# Patient Record
Sex: FEMALE | Race: WHITE | Employment: UNEMPLOYED | URBAN - NONMETROPOLITAN AREA
[De-identification: names, ages, dates, MRNs, and addresses within clinical notes are randomized per-mention and may not be internally consistent; named-entity substitution may affect disease eponyms.]

---

## 2021-09-03 ENCOUNTER — TELEPHONE (OUTPATIENT)
Dept: PRIMARY CARE CLINIC | Age: 36
End: 2021-09-03

## 2021-09-03 NOTE — TELEPHONE ENCOUNTER
pt been weaning off of Cymbalta. April 7th is when they started this. She is now down to 30 mg and wanted to know if she should refill that prescription or is it ok to now go completely off it. She stated they took her down by 30 mg each time. She has been on the 30 mg for  A month now.     Please advise

## 2021-09-15 DIAGNOSIS — G62.9 NEUROPATHY: Primary | ICD-10-CM

## 2021-09-15 RX ORDER — PREGABALIN 150 MG/1
CAPSULE ORAL
COMMUNITY
Start: 2021-08-17 | End: 2021-09-15 | Stop reason: SDUPTHER

## 2021-09-15 RX ORDER — PREGABALIN 150 MG/1
CAPSULE ORAL
Qty: 60 CAPSULE | Refills: 0 | Status: SHIPPED
Start: 2021-09-15 | End: 2021-10-18 | Stop reason: SDUPTHER

## 2021-09-15 NOTE — TELEPHONE ENCOUNTER
Pt has an appointment to schedule with you. Her first appointment was cancelled she only has enough lyrica for today and tomorrow and needs a refill until she sees you.

## 2021-09-20 ENCOUNTER — VIRTUAL VISIT (OUTPATIENT)
Dept: PRIMARY CARE CLINIC | Age: 36
End: 2021-09-20
Payer: OTHER GOVERNMENT

## 2021-09-20 DIAGNOSIS — Z30.431 IUD CHECK UP: ICD-10-CM

## 2021-09-20 DIAGNOSIS — Z11.59 NEED FOR HEPATITIS C SCREENING TEST: ICD-10-CM

## 2021-09-20 DIAGNOSIS — R56.9 SEIZURES (HCC): ICD-10-CM

## 2021-09-20 DIAGNOSIS — J01.40 ACUTE NON-RECURRENT PANSINUSITIS: ICD-10-CM

## 2021-09-20 DIAGNOSIS — Z11.4 SCREENING FOR HIV (HUMAN IMMUNODEFICIENCY VIRUS): ICD-10-CM

## 2021-09-20 DIAGNOSIS — F41.9 ANXIETY: Primary | ICD-10-CM

## 2021-09-20 DIAGNOSIS — M79.7 FIBROMYALGIA: ICD-10-CM

## 2021-09-20 PROCEDURE — 99443 PR PHYS/QHP TELEPHONE EVALUATION 21-30 MIN: CPT | Performed by: NURSE PRACTITIONER

## 2021-09-20 RX ORDER — DULOXETIN HYDROCHLORIDE 60 MG/1
CAPSULE, DELAYED RELEASE ORAL
COMMUNITY
Start: 2021-06-15 | End: 2021-09-20

## 2021-09-20 RX ORDER — METHYLPREDNISOLONE 4 MG/1
TABLET ORAL
Qty: 1 KIT | Refills: 0 | Status: SHIPPED | OUTPATIENT
Start: 2021-09-20 | End: 2021-09-26

## 2021-09-20 RX ORDER — LEVOTHYROXINE SODIUM 0.03 MG/1
TABLET ORAL
COMMUNITY
Start: 2021-02-03 | End: 2022-04-01 | Stop reason: SDUPTHER

## 2021-09-20 RX ORDER — LEVETIRACETAM 500 MG/1
TABLET ORAL
COMMUNITY
Start: 2021-09-13

## 2021-09-20 RX ORDER — DULOXETIN HYDROCHLORIDE 30 MG/1
CAPSULE, DELAYED RELEASE ORAL
COMMUNITY
Start: 2021-06-18 | End: 2021-09-20

## 2021-09-20 RX ORDER — FLUOXETINE HYDROCHLORIDE 20 MG/1
20 CAPSULE ORAL DAILY
Qty: 30 CAPSULE | Refills: 3 | Status: SHIPPED
Start: 2021-09-20 | End: 2021-11-09

## 2021-09-20 RX ORDER — AZITHROMYCIN 250 MG/1
250 TABLET, FILM COATED ORAL SEE ADMIN INSTRUCTIONS
Qty: 6 TABLET | Refills: 0 | Status: SHIPPED | OUTPATIENT
Start: 2021-09-20 | End: 2021-09-25

## 2021-09-20 SDOH — ECONOMIC STABILITY: FOOD INSECURITY: WITHIN THE PAST 12 MONTHS, THE FOOD YOU BOUGHT JUST DIDN'T LAST AND YOU DIDN'T HAVE MONEY TO GET MORE.: NEVER TRUE

## 2021-09-20 SDOH — ECONOMIC STABILITY: FOOD INSECURITY: WITHIN THE PAST 12 MONTHS, YOU WORRIED THAT YOUR FOOD WOULD RUN OUT BEFORE YOU GOT MONEY TO BUY MORE.: NEVER TRUE

## 2021-09-20 ASSESSMENT — PATIENT HEALTH QUESTIONNAIRE - PHQ9
SUM OF ALL RESPONSES TO PHQ9 QUESTIONS 1 & 2: 0
SUM OF ALL RESPONSES TO PHQ QUESTIONS 1-9: 0
SUM OF ALL RESPONSES TO PHQ QUESTIONS 1-9: 0
1. LITTLE INTEREST OR PLEASURE IN DOING THINGS: 0
SUM OF ALL RESPONSES TO PHQ QUESTIONS 1-9: 0
2. FEELING DOWN, DEPRESSED OR HOPELESS: 0

## 2021-09-20 ASSESSMENT — SOCIAL DETERMINANTS OF HEALTH (SDOH): HOW HARD IS IT FOR YOU TO PAY FOR THE VERY BASICS LIKE FOOD, HOUSING, MEDICAL CARE, AND HEATING?: NOT HARD AT ALL

## 2021-09-20 NOTE — PROGRESS NOTES
Ally Booth is a 39 y.o. female evaluated via telephone on 9/20/2021. Consent:  She and/or health care decision maker is aware that that she may receive a bill for this telephone service, depending on her insurance coverage, and has provided verbal consent to proceed: Yes      Documentation:  I communicated with the patient and/or health care decision maker about to establish care and manage her chronic problems. She has a pertinent past medical history of anxiety, seizures and fibromyalgia. .   Details of this discussion including any medical advice provided: Patient recently moved here from [place] and is establishing care. Patient has establish care with neurology for her seizures with [facility]. She reports no recent seizures. Anxiety is poorly controlled on current regimen. Patient states that she has been taking Cymbalta 30 mg every other day Valium 5 mg in the morning and Soma nightly. She states since the increased dose of Keppra her anxiety has worsened. Previously she follows with psychiatry but has not established with psychiatry since moving. She denies any suicidal or homicidal ideation. Patient's biggest complaint is her fibromyalgia at this time. She has tried numerous medications in the past including TCAs, Cymbalta, gabapentin and currently on Lyrica. She states that her pain is unbearable on a regular basis and causing anxiety. She is going to follow-up with rheumatology at [facility]. I did advise her that given her clinical picture that it would be best if she establishes with psychiatry. I will continue to fill her prescriptions until she can get in with them. We will start the patient on Prozac 20 mg daily and continue all other medications. Administration and side effects were discussed. Red flag symptoms were discussed. She is to follow-up in office in 6 weeks. Patient also has an additional complaint of nasal congestion and nonproductive cough x5 days.   Denies any other associated symptoms including fever, chills, headache, ear pain, sore throat, postnasal drip, loss of taste or smell, chest congestion or shortness of breath. She declines COVID-19 testing at this time. She has tried over-the-counter antihistamines with minimal improvement of symptoms. Mildred Hearn was seen today for establish care, seizures, anxiety, sinus problem and cough. Diagnoses and all orders for this visit:    Anxiety  -     FLUoxetine (PROZAC) 20 MG capsule; Take 1 capsule by mouth daily  -     External Referral To Psychiatry  -     CBC Auto Differential; Future  -     Comprehensive Metabolic Panel; Future  -     TSH without Reflex; Future  -     Lipid Panel; Future  -     HIV Screen; Future  -     Hemoglobin A1C; Future  -     HEPATITIS C ANTIBODY; Future    Fibromyalgia  -     FLUoxetine (PROZAC) 20 MG capsule; Take 1 capsule by mouth daily  -     CBC Auto Differential; Future  -     Comprehensive Metabolic Panel; Future  -     TSH without Reflex; Future  -     Lipid Panel; Future  -     HIV Screen; Future  -     Hemoglobin A1C; Future  -     HEPATITIS C ANTIBODY; Future    Seizures (HCC)  -     TSH without Reflex; Future  -     Lipid Panel; Future  -     HIV Screen; Future  -     Hemoglobin A1C; Future  -     HEPATITIS C ANTIBODY; Future    Acute non-recurrent pansinusitis  -     methylPREDNISolone (MEDROL DOSEPACK) 4 MG tablet; Take by mouth.  -     azithromycin (ZITHROMAX) 250 MG tablet; Take 1 tablet by mouth See Admin Instructions for 5 days 500mg on day 1 followed by 250mg on days 2 - 5    IUD check up  -     ([facility]) - Ervin Carter MD, OB/GYN, [facility] ([facility])    Screening for HIV (human immunodeficiency virus)  -     HIV Screen; Future    Need for hepatitis C screening test  -     HEPATITIS C ANTIBODY; Future      Continue all medications as prescribed, side effects discussed. We will start the patient on Prozac for anxiety and fibromyalgia control. Will refer to psychiatry.   Follow-up in office in 6 weeks. Prescription was written for Medrol Dosepak and azithromycin, side effects and administration instructions discussed. Increase fluids, sleep with the head of the bed elevated. Coolmist humidifier. Tylenol as needed. Patient states that she had an IUD placed in 2018 and has been having irregular bleeding. Will refer to OB/GYN. Will obtain fasting lab work, will call with results. I affirm this is a Patient Initiated Episode with a Patient who has not had a related appointment within my department in the past 7 days or scheduled within the next 24 hours. Patient identification was verified at the start of the visit: Yes    Total Time: minutes: 21-30 minutes    The visit was conducted pursuant to the emergency declaration under the [address] [place] [address], [address] [place] [address] and the [employer] General Act. Patient identification was verified, and a caregiver was present when appropriate. The patient was located in a state where the provider was credentialed to provide care. Note: not billable if this call serves to triage the patient into an appointment for the relevant concern      TORI Jose NP     9/15 nasal congestion, cough. otc dayquil/nyquil/ cool mist humidifer. Seeing psychiatry. .. valium 5mg qd. Neurologist [facility]. Ppd. Increased 30 every other day. 4/7 seizures. Rheumatology [facility]. FIBROMYALGIA.

## 2021-10-14 ENCOUNTER — NURSE ONLY (OUTPATIENT)
Dept: PRIMARY CARE CLINIC | Age: 36
End: 2021-10-14
Payer: OTHER GOVERNMENT

## 2021-10-14 DIAGNOSIS — Z23 NEEDS FLU SHOT: Primary | ICD-10-CM

## 2021-10-14 PROCEDURE — 90674 CCIIV4 VAC NO PRSV 0.5 ML IM: CPT | Performed by: NURSE PRACTITIONER

## 2021-10-14 PROCEDURE — 99999 PR OFFICE/OUTPT VISIT,PROCEDURE ONLY: CPT | Performed by: NURSE PRACTITIONER

## 2021-10-14 PROCEDURE — 90471 IMMUNIZATION ADMIN: CPT | Performed by: NURSE PRACTITIONER

## 2021-10-18 DIAGNOSIS — G62.9 NEUROPATHY: ICD-10-CM

## 2021-10-18 RX ORDER — PREGABALIN 150 MG/1
CAPSULE ORAL
Qty: 60 CAPSULE | Refills: 2 | Status: SHIPPED
Start: 2021-10-18 | End: 2022-01-12

## 2021-10-18 NOTE — TELEPHONE ENCOUNTER
----- Message from Sentrial Must sent at 10/18/2021  9:17 AM EDT -----  Subject: Refill Request    QUESTIONS  Name of Medication? pregabalin (LYRICA) 150 MG capsule  Patient-reported dosage and instructions? 150 MG capsule  How many days do you have left? 0  Preferred Pharmacy? [id number] #[id number]  Pharmacy phone number (if available)? [phone]  ---------------------------------------------------------------------------  --------------,  Name of Medication? levothyroxine (SYNTHROID) 25 MCG tablet  Patient-reported dosage and instructions? 25 mcg tablet  How many days do you have left? 4  Preferred Pharmacy? [id number] #[id number]  Pharmacy phone number (if available)? [phone]  Additional Information for Provider? Patient states she is also on   diazapam as well and it needs refilled. Please advise.  ---------------------------------------------------------------------------  --------------  CALL BACK INFO  What is the best way for the office to contact you? OK to leave message on   voicemail  Preferred Call Back Phone Number?  [phone]

## 2021-10-25 DIAGNOSIS — F41.9 ANXIETY: Primary | ICD-10-CM

## 2021-10-25 RX ORDER — DIAZEPAM 5 MG/1
5 TABLET ORAL DAILY PRN
Qty: 30 TABLET | Refills: 2 | Status: SHIPPED | OUTPATIENT
Start: 2021-10-25 | End: 2021-11-24

## 2021-10-25 NOTE — TELEPHONE ENCOUNTER
Last Appointment:  9/20/2021  Future Appointments   Date Time Provider Ziyad Watsonisti   11/1/2021 10:00 AM TORI Crabtree NP [facility]   11/9/2021  8:30 AM TORI Crabtree NP [facility]   11/9/2021 11:30 AM Tomy Nieto MD [facility]

## 2021-11-09 ENCOUNTER — OFFICE VISIT (OUTPATIENT)
Dept: PRIMARY CARE CLINIC | Age: 36
End: 2021-11-09
Payer: OTHER GOVERNMENT

## 2021-11-09 VITALS
TEMPERATURE: 97.3 F | DIASTOLIC BLOOD PRESSURE: 70 MMHG | OXYGEN SATURATION: 97 % | BODY MASS INDEX: 30.22 KG/M2 | SYSTOLIC BLOOD PRESSURE: 108 MMHG | WEIGHT: 177 LBS | HEIGHT: 64 IN | HEART RATE: 91 BPM

## 2021-11-09 DIAGNOSIS — G40.409 OTHER GENERALIZED EPILEPSY, NOT INTRACTABLE, WITHOUT STATUS EPILEPTICUS (HCC): ICD-10-CM

## 2021-11-09 DIAGNOSIS — M54.42 CHRONIC BILATERAL LOW BACK PAIN WITH BILATERAL SCIATICA: Primary | ICD-10-CM

## 2021-11-09 DIAGNOSIS — F41.9 ANXIETY: ICD-10-CM

## 2021-11-09 DIAGNOSIS — R56.9 SEIZURES (HCC): ICD-10-CM

## 2021-11-09 DIAGNOSIS — M79.7 FIBROMYALGIA: ICD-10-CM

## 2021-11-09 DIAGNOSIS — Z11.4 SCREENING FOR HIV (HUMAN IMMUNODEFICIENCY VIRUS): ICD-10-CM

## 2021-11-09 DIAGNOSIS — Z11.59 NEED FOR HEPATITIS C SCREENING TEST: ICD-10-CM

## 2021-11-09 DIAGNOSIS — M54.41 CHRONIC BILATERAL LOW BACK PAIN WITH BILATERAL SCIATICA: Primary | ICD-10-CM

## 2021-11-09 DIAGNOSIS — G47.00 INSOMNIA, UNSPECIFIED TYPE: ICD-10-CM

## 2021-11-09 DIAGNOSIS — G89.29 CHRONIC BILATERAL LOW BACK PAIN WITH BILATERAL SCIATICA: Primary | ICD-10-CM

## 2021-11-09 LAB
ALBUMIN SERPL-MCNC: 4 G/DL (ref 3.5–5.2)
ALP BLD-CCNC: 101 U/L (ref 35–104)
ALT SERPL-CCNC: 18 U/L (ref 0–32)
ANION GAP SERPL CALCULATED.3IONS-SCNC: 12 MMOL/L (ref 7–16)
AST SERPL-CCNC: 16 U/L (ref 0–31)
BASOPHILS ABSOLUTE: 0.07 E9/L (ref 0–0.2)
BASOPHILS RELATIVE PERCENT: 1.1 % (ref 0–2)
BILIRUB SERPL-MCNC: 0.2 MG/DL (ref 0–1.2)
BUN BLDV-MCNC: 14 MG/DL (ref 6–20)
CALCIUM SERPL-MCNC: 9.2 MG/DL (ref 8.6–10.2)
CHLORIDE BLD-SCNC: 103 MMOL/L (ref 98–107)
CHOLESTEROL, TOTAL: 231 MG/DL (ref 0–199)
CO2: 22 MMOL/L (ref 22–29)
CREAT SERPL-MCNC: 0.9 MG/DL (ref 0.5–1)
EOSINOPHILS ABSOLUTE: 0.19 E9/L (ref 0.05–0.5)
EOSINOPHILS RELATIVE PERCENT: 3 % (ref 0–6)
GFR AFRICAN AMERICAN: >60
GFR NON-AFRICAN AMERICAN: >60 ML/MIN/1.73
GLUCOSE BLD-MCNC: 97 MG/DL (ref 74–99)
HBA1C MFR BLD: 5.1 % (ref 4–5.6)
HCT VFR BLD CALC: 44.7 % (ref 34–48)
HDLC SERPL-MCNC: 35 MG/DL
HEMOGLOBIN: 14.2 G/DL (ref 11.5–15.5)
IMMATURE GRANULOCYTES #: 0.01 E9/L
IMMATURE GRANULOCYTES %: 0.2 % (ref 0–5)
LDL CHOLESTEROL CALCULATED: 175 MG/DL (ref 0–99)
LYMPHOCYTES ABSOLUTE: 2.22 E9/L (ref 1.5–4)
LYMPHOCYTES RELATIVE PERCENT: 34.8 % (ref 20–42)
MCH RBC QN AUTO: 29.8 PG (ref 26–35)
MCHC RBC AUTO-ENTMCNC: 31.8 % (ref 32–34.5)
MCV RBC AUTO: 93.7 FL (ref 80–99.9)
MONOCYTES ABSOLUTE: 0.51 E9/L (ref 0.1–0.95)
MONOCYTES RELATIVE PERCENT: 8 % (ref 2–12)
NEUTROPHILS ABSOLUTE: 3.38 E9/L (ref 1.8–7.3)
NEUTROPHILS RELATIVE PERCENT: 52.9 % (ref 43–80)
PDW BLD-RTO: 13.8 FL (ref 11.5–15)
PLATELET # BLD: 404 E9/L (ref 130–450)
PMV BLD AUTO: 9.9 FL (ref 7–12)
POTASSIUM SERPL-SCNC: 4.7 MMOL/L (ref 3.5–5)
RBC # BLD: 4.77 E12/L (ref 3.5–5.5)
SODIUM BLD-SCNC: 137 MMOL/L (ref 132–146)
TOTAL PROTEIN: 7 G/DL (ref 6.4–8.3)
TRIGL SERPL-MCNC: 107 MG/DL (ref 0–149)
TSH SERPL DL<=0.05 MIU/L-ACNC: 3.44 UIU/ML (ref 0.27–4.2)
VLDLC SERPL CALC-MCNC: 21 MG/DL
WBC # BLD: 6.4 E9/L (ref 4.5–11.5)

## 2021-11-09 PROCEDURE — 96372 THER/PROPH/DIAG INJ SC/IM: CPT | Performed by: NURSE PRACTITIONER

## 2021-11-09 PROCEDURE — 99214 OFFICE O/P EST MOD 30 MIN: CPT | Performed by: NURSE PRACTITIONER

## 2021-11-09 RX ORDER — FLUOXETINE HYDROCHLORIDE 40 MG/1
60 CAPSULE ORAL
COMMUNITY
Start: 2021-11-01

## 2021-11-09 RX ORDER — TRIAMCINOLONE ACETONIDE 40 MG/ML
40 INJECTION, SUSPENSION INTRA-ARTICULAR; INTRAMUSCULAR ONCE
Status: COMPLETED | OUTPATIENT
Start: 2021-11-09 | End: 2021-11-09

## 2021-11-09 RX ORDER — KETOROLAC TROMETHAMINE 30 MG/ML
30 INJECTION, SOLUTION INTRAMUSCULAR; INTRAVENOUS ONCE
Status: COMPLETED | OUTPATIENT
Start: 2021-11-09 | End: 2021-11-09

## 2021-11-09 RX ORDER — CARISOPRODOL 350 MG/1
TABLET ORAL
COMMUNITY
Start: 2021-10-02 | End: 2021-11-09 | Stop reason: SDUPTHER

## 2021-11-09 RX ORDER — CARISOPRODOL 350 MG/1
TABLET ORAL
Qty: 30 TABLET | Refills: 2 | Status: SHIPPED | OUTPATIENT
Start: 2021-11-09 | End: 2022-02-09

## 2021-11-09 RX ADMIN — KETOROLAC TROMETHAMINE 30 MG: 30 INJECTION, SOLUTION INTRAMUSCULAR; INTRAVENOUS at 10:05

## 2021-11-09 RX ADMIN — TRIAMCINOLONE ACETONIDE 40 MG: 40 INJECTION, SUSPENSION INTRA-ARTICULAR; INTRAMUSCULAR at 10:06

## 2021-11-09 ASSESSMENT — ENCOUNTER SYMPTOMS
RHINORRHEA: 0
CONSTIPATION: 0
ABDOMINAL DISTENTION: 0
WHEEZING: 0
SHORTNESS OF BREATH: 0
NAUSEA: 0
BACK PAIN: 1
ABDOMINAL PAIN: 0
COUGH: 0
COLOR CHANGE: 0
VOMITING: 0
EYES NEGATIVE: 1
APNEA: 0
DIARRHEA: 0
VOICE CHANGE: 0
CHEST TIGHTNESS: 0
FACIAL SWELLING: 0

## 2021-11-09 NOTE — PROGRESS NOTES
2021     Ramior Rehman 39 y.o. female    : 1985    Chief Complaint:   Back Pain (back issues, wants sleep study, talk about EEG)       History of Present Illness:   Katie Ugalde is a 39 y.o. female who presents to the office for follow up on her chronic problems. She has a pertinent past medical history for epilepsy, anxiety, chronic low back pain, seizures and fibromyalgia. Patient has moved here from [place] and is currently establishing care with all specialist.  She does follow with neurology at [facility], Dr. Moriah Ibrahim. MRI of the brain was unremarkable. SUZAN positive. EEG revealed frequent spikes. She is currently taking Keppra without any seizures. She has established with psychiatry who increased her Prozac dose and feels that symptoms are controlled. Insomnia is controlled with Soma. Fibromyalgia was diagnosed in . She is currently taking Lyrica with minimal relief of symptoms. She is always in pain. She has been trying to get in with rheumatology, she is requesting a referral to pain managementfor fibromyalgia and chronic low back pain. Back pain has significantly worsened. She does report bilateral radiculopathy symptoms. Denies any bowel or urinary incontinence, weakness or paresthesias. She did have a lumbar spine fusion in 2013. The patient is not up-to-date with health maintenance screenings. Previous lab work was reviewed. Past Medical History:     Past Medical History:   Diagnosis Date    Thyroid disease        Past Surgical History:   Procedure Laterality Date    BACK SURGERY      FOOT SURGERY Left 2014    i and d with biopsy and removal of foreign body       History reviewed. No pertinent family history.     Social History     Tobacco Use    Smoking status: Current Every Day Smoker     Packs/day: 0.50     Types: Cigarettes    Smokeless tobacco: Never Used   Vaping Use    Vaping Use: Every day    Substances: Nicotine    Devices: Pre-filled or refillable cartridge, Refillable tank   Substance Use Topics    Alcohol use: Yes     Comment: occasionally    Drug use: Not on file       Medications:     Current Outpatient Medications:     FLUoxetine (PROZAC) 40 MG capsule, , Disp: , Rfl:     carisoprodol (SOMA) 350 MG tablet, TAKE 1 TABLET BY MOUTH EVERY DAY AT BEDTIME, Disp: 30 tablet, Rfl: 2    diazePAM (VALIUM) 5 MG tablet, Take 1 tablet by mouth daily as needed for Anxiety for up to 30 days. , Disp: 30 tablet, Rfl: 2    pregabalin (LYRICA) 150 MG capsule, TAKE 1 CAPSULE BY MOUTH TWICE DAILY, Disp: 60 capsule, Rfl: 2    levETIRAcetam (KEPPRA) 500 MG tablet, TAKE 1 TABLET BY MOUTH EVERY MORNING AND 2 TABLETS EVERY EVENING, Disp: , Rfl:     levothyroxine (SYNTHROID) 25 MCG tablet, , Disp: , Rfl:     Allergies   Allergen Reactions    Amoxicillin-Pot Clavulanate Hives    Hydrocodone-Acetaminophen      Other reaction(s): Unknown    Vicodin [Hydrocodone-Acetaminophen]        Review of Systems:   Review of Systems   Constitutional: Negative for activity change, appetite change, fatigue, fever and unexpected weight change. HENT: Negative for congestion, facial swelling, mouth sores, postnasal drip, rhinorrhea, sneezing, tinnitus and voice change. Eyes: Negative. Respiratory: Negative for apnea, cough, chest tightness, shortness of breath and wheezing. Cardiovascular: Negative for chest pain, palpitations and leg swelling. Gastrointestinal: Negative for abdominal distention, abdominal pain, constipation, diarrhea, nausea and vomiting. Endocrine: Negative for cold intolerance and heat intolerance. Genitourinary: Negative for difficulty urinating, dysuria, flank pain, frequency, pelvic pain and urgency. Musculoskeletal: Positive for back pain and myalgias. Negative for gait problem, joint swelling and neck pain. Skin: Negative for color change, pallor, rash and wound.    Allergic/Immunologic: Negative for environmental allergies and food allergies. Neurological: Negative for dizziness, tremors, seizures, syncope, facial asymmetry, speech difficulty, weakness, light-headedness, numbness and headaches. Psychiatric/Behavioral: Negative for agitation, behavioral problems, confusion, decreased concentration, dysphoric mood, sleep disturbance and suicidal ideas. The patient is not hyperactive. Physical Exam:   Vital Signs:  /70   Pulse 91   Temp 97.3 °F (36.3 °C)   Ht 5' 4\" (1.626 m)   Wt 177 lb (80.3 kg)   SpO2 97%   BMI 30.38 kg/m²    Oxygen Saturation Interpretation: Normal.  Physical Exam  Vitals and nursing note reviewed. Constitutional:       Appearance: Normal appearance. She is normal weight. HENT:      Head: Normocephalic and atraumatic. Right Ear: Tympanic membrane, ear canal and external ear normal.      Left Ear: Tympanic membrane, ear canal and external ear normal.      Nose: Nose normal.      Mouth/Throat:      Mouth: Mucous membranes are moist.      Pharynx: Oropharynx is clear. Eyes:      Extraocular Movements: Extraocular movements intact. Conjunctiva/sclera: Conjunctivae normal.      Pupils: Pupils are equal, round, and reactive to light. Cardiovascular:      Rate and Rhythm: Normal rate and regular rhythm. Pulses: Normal pulses. Heart sounds: Normal heart sounds. Pulmonary:      Effort: Pulmonary effort is normal.      Breath sounds: Normal breath sounds. No wheezing, rhonchi or rales. Abdominal:      General: Bowel sounds are normal. There is no distension. Palpations: Abdomen is soft. Tenderness: There is no abdominal tenderness. There is no rebound. Musculoskeletal:         General: Normal range of motion. Cervical back: Normal range of motion and neck supple. Lumbar back: No bony tenderness. Normal range of motion. Negative right straight leg raise test and negative left straight leg raise test.      Comments: No midline tenderness. There is tenderness with palpation to the paraspinal region of the lumbar spine. Skin:     General: Skin is warm and dry. Capillary Refill: Capillary refill takes less than 2 seconds. Neurological:      General: No focal deficit present. Mental Status: She is alert and oriented to person, place, and time. Psychiatric:         Mood and Affect: Mood normal.         Behavior: Behavior normal.         Thought Content: Thought content normal.         Judgment: Judgment normal.         Health Maintenance:     Health Maintenance   Topic Date Due    Hepatitis C screen  Never done    Varicella vaccine (1 of 2 - 2-dose childhood series) Never done    Pneumococcal 0-64 years Vaccine (1 of 2 - PPSV23) Never done    COVID-19 Vaccine (1) Never done    HIV screen  Never done    Cervical cancer screen  Never done    DTaP/Tdap/Td vaccine (2 - Td or Tdap) 07/02/2024    Flu vaccine  Completed    Hepatitis A vaccine  Aged Out    Hepatitis B vaccine  Aged Out    Hib vaccine  Aged Out    Meningococcal (ACWY) vaccine  Aged [employer] History   Administered Date(s) Administered    Influenza, MDCK Quadv, IM, PF (Flucelvax 2 yrs and older) 10/14/2021    Tdap (Boostrix, Adacel) 07/02/2014        Testing: All laboratory and radiology results have been personally reviewed by myself. Labs:  No results found for this visit on 11/09/21. Imaging: All Radiology results interpreted by Radiologist unless otherwise noted. No results found. Assessment/Plan:   I personally reviewed the patient's allergies, past medical history, medications, and vitals sign. Oz Gómez was seen today for back pain.     Diagnoses and all orders for this visit:    Chronic bilateral low back pain with bilateral sciatica  -     External Referral To Pain Clinic  -     triamcinolone acetonide (KENALOG-40) injection 40 mg  -     ketorolac (TORADOL) injection 30 mg    Fibromyalgia  -     External Referral To Pain Clinic    Insomnia, unspecified type  -     carisoprodol (SOMA) 350 MG tablet; TAKE 1 TABLET BY MOUTH EVERY DAY AT BEDTIME    Other generalized epilepsy, not intractable, without status epilepticus ([address]    Anxiety    Seizures ([address]        Continue all medications as prescribed, side effects discussed. Keep follow-up appointments with specialist.  I did have an extensive conversation with the patient regarding the prescription of Valium. She should request psychiatry to take over this medication. Kenalog and Toradol injection given today in office. Referral placed to pain management. Will obtain lab work at next visit. Call or go to ED immediately if symptoms worsen or persist.  Return in about 3 months (around 2/9/2022) for 3 mth f/u vv. Sooner if necessary. Counseled regarding above diagnosis, including possible risks and complications,especially if left uncontrolled. Counseled regarding the possible side effects, risks, benefits and alternatives to treatment; patient and/or guardian verbalizes understanding. Advised patient to call with any new medication issues. All questions answered. Reviewed age and gender appropriate health screening exams and vaccinations. Advised patient regarding importance of keeping up with recommended health maintenance and to schedule as soon as possible if overdue, as this is important in assessing for undiagnosed pathology, especially cancer. Patient verbalizes understanding and agrees. David Lewis, TORI - NP     **This report was transcribed using voice recognition software. Every effort was made to ensure accuracy; however, inadvertent computerized transcription errors may be present.

## 2021-11-10 LAB
HEPATITIS C ANTIBODY INTERPRETATION: NORMAL
HIV-1 AND HIV-2 ANTIBODIES: NORMAL

## 2022-02-18 ENCOUNTER — OFFICE VISIT (OUTPATIENT)
Dept: FAMILY MEDICINE CLINIC | Age: 37
End: 2022-02-18
Payer: OTHER GOVERNMENT

## 2022-02-18 VITALS
HEART RATE: 94 BPM | DIASTOLIC BLOOD PRESSURE: 66 MMHG | OXYGEN SATURATION: 96 % | SYSTOLIC BLOOD PRESSURE: 110 MMHG | WEIGHT: 172 LBS | BODY MASS INDEX: 29.37 KG/M2 | RESPIRATION RATE: 18 BRPM | HEIGHT: 64 IN | TEMPERATURE: 97.3 F

## 2022-02-18 DIAGNOSIS — N30.01 ACUTE CYSTITIS WITH HEMATURIA: Primary | ICD-10-CM

## 2022-02-18 DIAGNOSIS — G62.9 NEUROPATHY: ICD-10-CM

## 2022-02-18 DIAGNOSIS — R30.0 DYSURIA: ICD-10-CM

## 2022-02-18 LAB
BILIRUBIN, POC: NORMAL
BLOOD URINE, POC: NORMAL
CLARITY, POC: CLEAR
COLOR, POC: YELLOW
GLUCOSE URINE, POC: NORMAL
KETONES, POC: NORMAL
LEUKOCYTE EST, POC: NORMAL
NITRITE, POC: NORMAL
PH, POC: 7
PROTEIN, POC: NORMAL
SPECIFIC GRAVITY, POC: 1.02
UROBILINOGEN, POC: NORMAL

## 2022-02-18 PROCEDURE — 81002 URINALYSIS NONAUTO W/O SCOPE: CPT | Performed by: NURSE PRACTITIONER

## 2022-02-18 PROCEDURE — 99214 OFFICE O/P EST MOD 30 MIN: CPT | Performed by: NURSE PRACTITIONER

## 2022-02-18 RX ORDER — NITROFURANTOIN 25; 75 MG/1; MG/1
100 CAPSULE ORAL 2 TIMES DAILY
Qty: 10 CAPSULE | Refills: 0 | Status: SHIPPED | OUTPATIENT
Start: 2022-02-18 | End: 2022-02-23

## 2022-02-18 RX ORDER — PREGABALIN 150 MG/1
150 CAPSULE ORAL 2 TIMES DAILY
Qty: 60 CAPSULE | Refills: 0 | Status: SHIPPED
Start: 2022-02-18 | End: 2022-03-28 | Stop reason: SDUPTHER

## 2022-02-18 RX ORDER — PHENAZOPYRIDINE HYDROCHLORIDE 100 MG/1
100 TABLET, FILM COATED ORAL 3 TIMES DAILY PRN
Qty: 6 TABLET | Refills: 0 | Status: SHIPPED | OUTPATIENT
Start: 2022-02-18 | End: 2022-02-20

## 2022-02-18 NOTE — PROGRESS NOTES
2022     Mavis Rehman 44 y.o. female    : 1985  Chief Complaint:   Urinary Tract Infection      History of Present Illness   Source of history provided by:  patientJosey Alexander is a 44 y.o. old female who presents to the [facility] care with complaints of dysuria x 2 days. Reports associated frequency, urgency, and suprapubic pressure. Denies gross hematuria. Denies associated flank pain. Denies any fever, chills, vaginal discharge, vaginal bleeding, possibility of pregnancy, vomiting, diarrhea, or lethargy. No LMP recorded. She also has an additional complaint of neuropathy and requesting refill on Lyrica. Symptoms are controlled with medication. Denies any side effects. ROS   Past Medical History:   Past Medical History:   Diagnosis Date    [facility]     Thyroid disease      Past Surgical History:   Past Surgical History:   Procedure Laterality Date    BACK SURGERY      FOOT SURGERY Left 2014    i and d with biopsy and removal of foreign body     Social History:  reports that she has been smoking cigarettes. She has been smoking about 0.50 packs per day. She has never used smokeless tobacco. She reports current alcohol use. Family History: family history is not on file. Allergies: Amoxicillin-pot clavulanate, Hydrocodone-acetaminophen, and Vicodin [hydrocodone-acetaminophen]    Unless otherwise stated in this report the patient's positive and negative responses for review of systems for constitutional, eyes, ENT, cardiovascular, respiratory, gastrointestinal, neurological, , musculoskeletal, and integument systems and related systems to the presenting problem are either stated in the history of present illness or were not pertinent or were negative for the symptoms and/or complaints related to the presenting medical problem. Positives and pertinent negatives as per HPI. All others reviewed and are negative.     Physical Exam   VS:   Vitals:    22 1151 BP: 110/66   Pulse: 94   Resp: 18   Temp: 97.3 °F (36.3 °C)   TempSrc: Temporal   SpO2: 96%   Weight: 172 lb (78 kg)   Height: 5' 4\" (1.626 m)     Oxygen Saturation Interpretation: Normal.    Constitutional:  A&Ox3, development consistent with age, NAD. Lungs:  CTAB without wheezing, rales, or rhonchi. Heart:  RRR without pathologic murmurs, rubs, or gallops. Abdomen: Soft, nondistended, with mild suprapubic tenderness. No rebound, rigidity, or guarding. BS+ X4. No organomegaly. Back: No CVA tenderness. Skin:  Normal turgor. Warm, dry, without visible rash, unless noted elsewhere. Neurological:  Alert and oriented. Motor functions intact. Responds to verbal commands. Lab / Imaging Results   All laboratory and radiology results have been personally reviewed by myself. Labs:  Results for orders placed or performed in visit on 02/18/22   POCT Urinalysis no Micro   Result Value Ref Range    Color, UA yellow     Clarity, UA clear     Glucose, UA POC neg     Bilirubin, UA neg     Ketones, UA trace     Spec Grav, UA 1.025     Blood, UA POC trace     pH, UA 7.0     Protein, UA POC 30 mg/dl     Urobilinogen, UA 0.2 eu/dl     Leukocytes, UA mod     Nitrite, UA pos        Imaging: All Radiology results interpreted by Radiologist unless otherwise noted. No orders to display       Medical Decision Making:    Patient is well appearing, non toxic and appropriate for outpatient management. Plan is for symptom management and PCP follow up. Assessment / David Ramos was seen today for urinary tract infection. Diagnoses and all orders for this visit:    Acute cystitis with hematuria  -     nitrofurantoin, macrocrystal-monohydrate, (MACROBID) 100 MG capsule; Take 1 capsule by mouth 2 times daily for 5 days  -     phenazopyridine (PYRIDIUM) 100 MG tablet; Take 1 tablet by mouth 3 times daily as needed for Pain    Dysuria  -     POCT Urinalysis no Micro  -     Culture, Urine;  Future    Neuropathy  - pregabalin (LYRICA) 150 MG capsule; Take 1 capsule by mouth 2 times daily for 30 days. UA appears positive for a UTI. Urine C&S pending, will call with results once available. Script written for Macrobid and Pyridium, side effects discussed. Increase fluids and rest. F/u PCP in 3-5 days if symptoms persist. ED sooner if symptoms worsen or change. ED immediately with the development of fever, shaking chills, body aches, flank pain, vomiting, CP, or SOB. Pt is in agreement with this care plan. All questions answered.      Lurdes Lin, APRN - NP

## 2022-02-20 LAB
ORGANISM: ABNORMAL
URINE CULTURE, ROUTINE: ABNORMAL

## 2022-02-28 DIAGNOSIS — G47.00 INSOMNIA, UNSPECIFIED TYPE: Primary | ICD-10-CM

## 2022-02-28 RX ORDER — CARISOPRODOL 350 MG/1
350 TABLET ORAL DAILY
Qty: 30 TABLET | Refills: 0 | Status: SHIPPED | OUTPATIENT
Start: 2022-02-28 | End: 2022-03-30

## 2022-02-28 NOTE — TELEPHONE ENCOUNTER
Last Appointment:  2/18/2022  Future Appointments   Date Time Provider Ziyad Trcaey   3/8/2022  9:40 AM Jamie Aldrich MD [facility]   4/1/2022  9:00 AM Craig Jones

## 2022-03-25 DIAGNOSIS — G62.9 NEUROPATHY: ICD-10-CM

## 2022-03-25 DIAGNOSIS — G47.00 INSOMNIA, UNSPECIFIED TYPE: ICD-10-CM

## 2022-03-25 RX ORDER — DIAZEPAM 5 MG/1
5 TABLET ORAL EVERY 6 HOURS PRN
Qty: 120 TABLET | Refills: 0 | Status: CANCELLED | OUTPATIENT
Start: 2022-03-25 | End: 2022-04-24

## 2022-03-25 RX ORDER — DIAZEPAM 5 MG/1
5 TABLET ORAL EVERY 6 HOURS PRN
Qty: 120 TABLET | Refills: 1 | Status: CANCELLED | OUTPATIENT
Start: 2022-03-25 | End: 2022-04-24

## 2022-03-25 RX ORDER — CARISOPRODOL 350 MG/1
350 TABLET ORAL DAILY
Qty: 30 TABLET | Refills: 0 | Status: CANCELLED | OUTPATIENT
Start: 2022-03-25 | End: 2022-04-24

## 2022-03-25 RX ORDER — PREGABALIN 150 MG/1
150 CAPSULE ORAL 2 TIMES DAILY
Qty: 60 CAPSULE | Refills: 0 | Status: CANCELLED | OUTPATIENT
Start: 2022-03-25 | End: 2022-04-24

## 2022-03-25 NOTE — TELEPHONE ENCOUNTER
Informed patient that she would need to come in through express care and be evaluated again. We are unable to send in something. Patient stated she would just try OTC medications.

## 2022-03-25 NOTE — TELEPHONE ENCOUNTER
Last Appointment:  2/18/2022  Future Appointments   Date Time Provider Ziyad Webb   4/1/2022  9:00 AM Craig Glez

## 2022-03-25 NOTE — TELEPHONE ENCOUNTER
----- Message from Franc Patel sent at 3/25/2022  8:56 AM EDT -----  Subject: Message to Provider    QUESTIONS  Information for Provider? pt is calling bc her uti came back. it went away   for few days and now back. pt is asking for stronger meds. she has a cold   and does not want to come in the office if possible   ---------------------------------------------------------------------------  --------------  [address]  What is the best way for the office to contact you? OK to leave message on   voicemail  Preferred Call Back Phone Number? [phone]  ---------------------------------------------------------------------------  --------------  SCRIPT ANSWERS  Relationship to Patient?  Self

## 2022-03-28 DIAGNOSIS — G62.9 NEUROPATHY: ICD-10-CM

## 2022-03-28 RX ORDER — PREGABALIN 150 MG/1
150 CAPSULE ORAL 2 TIMES DAILY
Qty: 60 CAPSULE | Refills: 0 | Status: SHIPPED
Start: 2022-03-28 | End: 2022-04-01 | Stop reason: SDUPTHER

## 2022-04-01 ENCOUNTER — OFFICE VISIT (OUTPATIENT)
Dept: PRIMARY CARE CLINIC | Age: 37
End: 2022-04-01
Payer: OTHER GOVERNMENT

## 2022-04-01 ENCOUNTER — TELEPHONE (OUTPATIENT)
Dept: PRIMARY CARE CLINIC | Age: 37
End: 2022-04-01

## 2022-04-01 VITALS
HEART RATE: 86 BPM | OXYGEN SATURATION: 97 % | RESPIRATION RATE: 16 BRPM | SYSTOLIC BLOOD PRESSURE: 116 MMHG | HEIGHT: 64 IN | BODY MASS INDEX: 29.71 KG/M2 | DIASTOLIC BLOOD PRESSURE: 60 MMHG | WEIGHT: 174 LBS

## 2022-04-01 DIAGNOSIS — M79.7 FIBROMYALGIA: ICD-10-CM

## 2022-04-01 DIAGNOSIS — F41.9 ANXIETY: ICD-10-CM

## 2022-04-01 DIAGNOSIS — E03.9 HYPOTHYROIDISM, UNSPECIFIED TYPE: ICD-10-CM

## 2022-04-01 DIAGNOSIS — F31.9 BIPOLAR 1 DISORDER (HCC): ICD-10-CM

## 2022-04-01 DIAGNOSIS — R35.0 FREQUENCY OF URINATION: ICD-10-CM

## 2022-04-01 DIAGNOSIS — R56.9 SEIZURES (HCC): Primary | ICD-10-CM

## 2022-04-01 DIAGNOSIS — N30.01 ACUTE CYSTITIS WITH HEMATURIA: ICD-10-CM

## 2022-04-01 DIAGNOSIS — G47.00 INSOMNIA, UNSPECIFIED TYPE: ICD-10-CM

## 2022-04-01 DIAGNOSIS — G62.9 NEUROPATHY: ICD-10-CM

## 2022-04-01 DIAGNOSIS — H69.83 ETD (EUSTACHIAN TUBE DYSFUNCTION), BILATERAL: ICD-10-CM

## 2022-04-01 LAB
AMPHETAMINE SCREEN, URINE: NOT DETECTED
BARBITURATE SCREEN URINE: NOT DETECTED
BENZODIAZEPINE SCREEN, URINE: POSITIVE
BILIRUBIN, POC: NEGATIVE
BLOOD URINE, POC: NORMAL
CANNABINOID SCREEN URINE: NOT DETECTED
CLARITY, POC: NORMAL
COCAINE METABOLITE SCREEN URINE: NOT DETECTED
COLOR, POC: NORMAL
FENTANYL SCREEN, URINE: NOT DETECTED
GLUCOSE URINE, POC: NEGATIVE
KETONES, POC: NEGATIVE
LEUKOCYTE EST, POC: NORMAL
Lab: ABNORMAL
METHADONE SCREEN, URINE: NOT DETECTED
NITRITE, POC: NEGATIVE
OPIATE SCREEN URINE: NOT DETECTED
OXYCODONE URINE: NOT DETECTED
PH, POC: 6.5
PHENCYCLIDINE SCREEN URINE: NOT DETECTED
PROTEIN, POC: NEGATIVE
SPECIFIC GRAVITY, POC: 1.02
UROBILINOGEN, POC: 0.2

## 2022-04-01 PROCEDURE — 99214 OFFICE O/P EST MOD 30 MIN: CPT | Performed by: NURSE PRACTITIONER

## 2022-04-01 PROCEDURE — 81002 URINALYSIS NONAUTO W/O SCOPE: CPT | Performed by: NURSE PRACTITIONER

## 2022-04-01 RX ORDER — LAMOTRIGINE 100 MG/1
TABLET ORAL
COMMUNITY
Start: 2022-03-07 | End: 2022-09-27

## 2022-04-01 RX ORDER — CARISOPRODOL 350 MG/1
350 TABLET ORAL DAILY
COMMUNITY
End: 2022-04-01 | Stop reason: SDUPTHER

## 2022-04-01 RX ORDER — DIAZEPAM 5 MG/1
5 TABLET ORAL EVERY 6 HOURS PRN
Qty: 120 TABLET | Refills: 3 | Status: CANCELLED | OUTPATIENT
Start: 2022-04-01 | End: 2022-05-01

## 2022-04-01 RX ORDER — CEFDINIR 300 MG/1
300 CAPSULE ORAL 2 TIMES DAILY
Qty: 14 CAPSULE | Refills: 0 | Status: SHIPPED | OUTPATIENT
Start: 2022-04-01 | End: 2022-04-08

## 2022-04-01 RX ORDER — CARISOPRODOL 350 MG/1
TABLET ORAL
Qty: 30 TABLET | OUTPATIENT
Start: 2022-04-01

## 2022-04-01 RX ORDER — PREGABALIN 150 MG/1
150 CAPSULE ORAL 2 TIMES DAILY
Qty: 60 CAPSULE | Refills: 0 | Status: CANCELLED | OUTPATIENT
Start: 2022-04-01 | End: 2022-05-01

## 2022-04-01 RX ORDER — LEVOTHYROXINE SODIUM 0.03 MG/1
25 TABLET ORAL DAILY
Qty: 90 TABLET | Refills: 1 | Status: SHIPPED | OUTPATIENT
Start: 2022-04-01 | End: 2022-05-01

## 2022-04-01 RX ORDER — CARISOPRODOL 350 MG/1
350 TABLET ORAL DAILY
Qty: 30 TABLET | Refills: 3 | Status: SHIPPED
Start: 2022-04-01 | End: 2022-08-11

## 2022-04-01 RX ORDER — PHENAZOPYRIDINE HYDROCHLORIDE 100 MG/1
100 TABLET, FILM COATED ORAL 3 TIMES DAILY PRN
Qty: 6 TABLET | Refills: 0 | Status: SHIPPED | OUTPATIENT
Start: 2022-04-01 | End: 2022-04-03

## 2022-04-01 RX ORDER — LEVOTHYROXINE SODIUM 0.03 MG/1
25 TABLET ORAL DAILY
Qty: 30 TABLET | Refills: 3 | Status: SHIPPED
Start: 2022-04-01 | End: 2022-04-01

## 2022-04-01 RX ORDER — PREDNISONE 10 MG/1
TABLET ORAL
Qty: 18 TABLET | Refills: 0 | Status: SHIPPED | OUTPATIENT
Start: 2022-04-01 | End: 2022-04-10

## 2022-04-01 RX ORDER — PREGABALIN 150 MG/1
150 CAPSULE ORAL 2 TIMES DAILY
Qty: 60 CAPSULE | Refills: 2 | Status: SHIPPED | OUTPATIENT
Start: 2022-04-01 | End: 2022-05-01

## 2022-04-01 ASSESSMENT — ENCOUNTER SYMPTOMS
ABDOMINAL DISTENTION: 0
DIARRHEA: 0
SHORTNESS OF BREATH: 0
APNEA: 0
EYES NEGATIVE: 1
ABDOMINAL PAIN: 0
RHINORRHEA: 0
COLOR CHANGE: 0
BACK PAIN: 1
CONSTIPATION: 0
WHEEZING: 0
COUGH: 0
VOICE CHANGE: 0
FACIAL SWELLING: 0
NAUSEA: 0
VOMITING: 0
CHEST TIGHTNESS: 0

## 2022-04-01 NOTE — PROGRESS NOTES
2022     Tawny Rehman 44 y.o. female    : 1985    Chief Complaint:   Urinary Tract Infection       History of Present Illness:   Leti Guadalupe is a 44 y.o. female who presents to the office for follow up on her chronic problems. She has a pertinent past medical history for epilepsy, anxiety, chronic low back pain, seizures and fibromyalgia. She does follow with neurology at [facility], Dr. Hallie Cruz. MRI of the brain was unremarkable. SUZAN positive. EEG revealed frequent spikes. She is currently taking Keppra without any seizures. She does report new onset vertigo and has a follow up appointment in 1 week. Anxiety/depression- She has established with psychiatry, Agata Bates a few pills her medications. She is taking her medications as prescribed. Insomnia is controlled with Soma. Fibromyalgia was diagnosed in . She is currently taking Lyrica with relief of symptoms. She is always in pain. Back pain has significantly worsened. She does report bilateral radiculopathy symptoms. Denies any bowel or urinary incontinence, weakness or paresthesias. She did have a lumbar spine fusion in 2013. She currently follows with Dr. Darlyn Perales who suggested injections vs. Ablation. The patient is not up-to-date with health maintenance screenings. Previous lab work was reviewed. Past Medical History:     Past Medical History:   Diagnosis Date    Epilepsy ([address]     Thyroid disease        Past Surgical History:   Procedure Laterality Date    BACK SURGERY      FOOT SURGERY Left 2014    i and d with biopsy and removal of foreign body       History reviewed. No pertinent family history.     Social History     Tobacco Use    Smoking status: Current Every Day Smoker     Packs/day: 0.50     Types: Cigarettes    Smokeless tobacco: Never Used   Vaping Use    Vaping Use: Every day    Substances: Nicotine    Devices: Pre-filled or refillable cartridge, Refillable tank   Substance Use Topics    Alcohol use: Yes     Comment: occasionally    Drug use: Not on file       Medications:     Current Outpatient Medications:     pregabalin (LYRICA) 150 MG capsule, Take 1 capsule by mouth 2 times daily for 30 days. , Disp: 60 capsule, Rfl: 2    levothyroxine (SYNTHROID) 25 MCG tablet, Take 1 tablet by mouth Daily, Disp: 30 tablet, Rfl: 3    carisoprodol (SOMA) 350 MG tablet, Take 1 tablet by mouth daily for 30 days. , Disp: 30 tablet, Rfl: 3    phenazopyridine (PYRIDIUM) 100 MG tablet, Take 1 tablet by mouth 3 times daily as needed for Pain, Disp: 6 tablet, Rfl: 0    cefdinir (OMNICEF) 300 MG capsule, Take 1 capsule by mouth 2 times daily for 7 days, Disp: 14 capsule, Rfl: 0    predniSONE (DELTASONE) 10 MG tablet, Take 3 tablets by mouth daily for 3 days, THEN 2 tablets daily for 3 days, THEN 1 tablet daily for 3 days. , Disp: 18 tablet, Rfl: 0    diclofenac (VOLTAREN) 75 MG EC tablet, TAKE 1 TABLET BY MOUTH TWICE DAILY WITH FOOD AS NEEDED FOR PAIN, Disp: , Rfl:     lamoTRIgine (LAMICTAL) 25 MG tablet, , Disp: , Rfl:     ARIPiprazole (ABILIFY) 2 MG tablet, , Disp: , Rfl:     diazePAM (VALIUM) 5 MG tablet, Take 5 mg by mouth every 6 hours as needed for Anxiety. , Disp: , Rfl:     FLUoxetine (PROZAC) 40 MG capsule, 60 mg , Disp: , Rfl:     levETIRAcetam (KEPPRA) 500 MG tablet, TAKE 1 TABLET BY MOUTH EVERY MORNING AND 2 TABLETS EVERY EVENING, Disp: , Rfl:     lamoTRIgine (LAMICTAL) 100 MG tablet, TAKE 1 TABLET BY MOUTH TWICE DAILY, Disp: , Rfl:     Allergies   Allergen Reactions    Amoxicillin-Pot Clavulanate Hives    Hydrocodone-Acetaminophen      Other reaction(s): Unknown    Vicodin [Hydrocodone-Acetaminophen]        Review of Systems:   Review of Systems   Constitutional: Negative for activity change, appetite change, fatigue, fever and unexpected weight change. HENT: Negative for congestion, facial swelling, mouth sores, postnasal drip, rhinorrhea, sneezing, tinnitus and voice change. Eyes: Negative. Respiratory: Negative for apnea, cough, chest tightness, shortness of breath and wheezing. Cardiovascular: Negative for chest pain, palpitations and leg swelling. Gastrointestinal: Negative for abdominal distention, abdominal pain, constipation, diarrhea, nausea and vomiting. Endocrine: Negative for cold intolerance and heat intolerance. Genitourinary: Negative for difficulty urinating, dysuria, flank pain, frequency, pelvic pain and urgency. Musculoskeletal: Positive for back pain. Negative for gait problem, joint swelling, myalgias and neck pain. Skin: Negative for color change, pallor, rash and wound. Allergic/Immunologic: Negative for environmental allergies and food allergies. Neurological: Positive for dizziness. Negative for tremors, seizures, syncope, facial asymmetry, speech difficulty, weakness, light-headedness, numbness and headaches. Psychiatric/Behavioral: Negative for agitation, behavioral problems, confusion, decreased concentration, dysphoric mood, sleep disturbance and suicidal ideas. The patient is not hyperactive. Physical Exam:   Vital Signs:  /60   Pulse 86   Resp 16   Ht 5' 4\" (1.626 m)   Wt 174 lb (78.9 kg)   SpO2 97%   BMI 29.87 kg/m²    Oxygen Saturation Interpretation: Normal.  Physical Exam  Vitals and nursing note reviewed. Constitutional:       Appearance: Normal appearance. She is normal weight. HENT:      Head: Normocephalic and atraumatic. Right Ear: Ear canal and external ear normal. A middle ear effusion is present. Left Ear: Ear canal and external ear normal. A middle ear effusion is present. Nose: Nose normal.      Mouth/Throat:      Mouth: Mucous membranes are moist.      Pharynx: Oropharynx is clear. Eyes:      Extraocular Movements: Extraocular movements intact. Conjunctiva/sclera: Conjunctivae normal.      Pupils: Pupils are equal, round, and reactive to light.    Cardiovascular:      Rate and Rhythm: Normal rate and regular rhythm. Pulses: Normal pulses. Heart sounds: Normal heart sounds. Pulmonary:      Effort: Pulmonary effort is normal.      Breath sounds: Normal breath sounds. No wheezing, rhonchi or rales. Abdominal:      General: Bowel sounds are normal. There is no distension. Palpations: Abdomen is soft. Tenderness: There is no abdominal tenderness. There is no rebound. Musculoskeletal:         General: Normal range of motion. Cervical back: Normal range of motion and neck supple. Lumbar back: No bony tenderness. Normal range of motion. Negative right straight leg raise test and negative left straight leg raise test.      Comments: No midline tenderness. There is tenderness with palpation to the paraspinal region of the lumbar spine. Skin:     General: Skin is warm and dry. Capillary Refill: Capillary refill takes less than 2 seconds. Neurological:      General: No focal deficit present. Mental Status: She is alert and oriented to person, place, and time. Psychiatric:         Mood and Affect: Mood normal. Affect is flat. Behavior: Behavior normal.         Thought Content:  Thought content normal.         Judgment: Judgment normal.         Health Maintenance:     Health Maintenance   Topic Date Due    Varicella vaccine (1 of 2 - 2-dose childhood series) Never done    COVID-19 Vaccine (1) Never done    Pneumococcal 0-64 years Vaccine (1 of 2 - PPSV23) Never done    Depression Monitoring  09/20/2022    DTaP/Tdap/Td vaccine (2 - Td or Tdap) 07/02/2024    Cervical cancer screen  01/20/2025    Flu vaccine  Completed    Hepatitis C screen  Completed    HIV screen  Completed    Hepatitis A vaccine  Aged Out    Hepatitis B vaccine  Aged Out    Hib vaccine  Aged Out    Meningococcal (ACWY) vaccine  Aged [employer] History   Administered Date(s) Administered    Influenza, MDCK Quadv, IM, PF (Flucelvax 2 yrs and older) 10/14/2021    Tdap (Boostrix, Adacel) 07/02/2014        Testing: All laboratory and radiology results have been personally reviewed by myself. Labs:  Results for orders placed or performed in visit on 04/01/22   POCT Urinalysis no Micro   Result Value Ref Range    Color, UA      Clarity, UA      Glucose, UA POC negative     Bilirubin, UA negative     Ketones, UA negative     Spec Grav, UA 1.020     Blood, UA POC moderate     pH, UA 6.5     Protein, UA POC negative     Urobilinogen, UA 0.2     Leukocytes, UA small     Nitrite, UA negative         Imaging: All Radiology results interpreted by Radiologist unless otherwise noted. No results found. Assessment/Plan:   I personally reviewed the patient's allergies, past medical history, medications, and vitals sign. Leyla Sanchez was seen today for urinary tract infection. Diagnoses and all orders for this visit:    Seizures ([address]    Frequency of urination  -     POCT Urinalysis no Micro  -     Culture, Urine; Future    Neuropathy  -     pregabalin (LYRICA) 150 MG capsule; Take 1 capsule by mouth 2 times daily for 30 days. Anxiety  -     URINE DRUG SCREEN; Future    Insomnia, unspecified type  -     carisoprodol (SOMA) 350 MG tablet; Take 1 tablet by mouth daily for 30 days. Fibromyalgia    Acute cystitis with hematuria  -     phenazopyridine (PYRIDIUM) 100 MG tablet; Take 1 tablet by mouth 3 times daily as needed for Pain  -     cefdinir (OMNICEF) 300 MG capsule; Take 1 capsule by mouth 2 times daily for 7 days    Bipolar 1 disorder (HCC)    ETD (Eustachian tube dysfunction), bilateral  -     predniSONE (DELTASONE) 10 MG tablet; Take 3 tablets by mouth daily for 3 days, THEN 2 tablets daily for 3 days, THEN 1 tablet daily for 3 days. Hypothyroidism, unspecified type  -     levothyroxine (SYNTHROID) 25 MCG tablet; Take 1 tablet by mouth Daily        Continue all medications as prescribed, side effects discussed.   Keep follow-up appointments with specialist.  Prescription written for Ioana Praveen report reviewed and appropriate. I did advise the patient that I cannot get her Valium given that this is managed by psychiatry. We will start the patient on cefdinir and Pyridium for UTI, previous urine culture was positive for greater than 100 K E. coli. We will start the patient on prednisone taper for eustachian tube dysfunction. Advised patient to take antihistamine over-the-counter and Sudafed for the next 3 days. Call or go to ED immediately if symptoms worsen or persist.  Return in about 3 months (around 7/1/2022) for 3-month follow-up. Sooner if necessary. Counseled regarding above diagnosis, including possible risks and complications,especially if left uncontrolled. Counseled regarding the possible side effects, risks, benefits and alternatives to treatment; patient and/or guardian verbalizes understanding. Advised patient to call with any new medication issues. All questions answered. Reviewed age and gender appropriate health screening exams and vaccinations. Advised patient regarding importance of keeping up with recommended health maintenance and to schedule as soon as possible if overdue, as this is important in assessing for undiagnosed pathology, especially cancer. Patient verbalizes understanding and agrees. Leanne Her, TORI - NP     **This report was transcribed using voice recognition software. Every effort was made to ensure accuracy; however, inadvertent computerized transcription errors may be present.

## 2022-04-01 NOTE — Clinical Note
Can we please call the patient know that I cannot fill the Valium. This is filled by psychiatry and she needs to call their office for refills.

## 2022-04-04 LAB
ORGANISM: ABNORMAL
URINE CULTURE, ROUTINE: ABNORMAL

## 2022-06-08 ENCOUNTER — OFFICE VISIT (OUTPATIENT)
Dept: FAMILY MEDICINE CLINIC | Age: 37
End: 2022-06-08
Payer: OTHER GOVERNMENT

## 2022-06-08 VITALS
SYSTOLIC BLOOD PRESSURE: 118 MMHG | RESPIRATION RATE: 18 BRPM | WEIGHT: 174 LBS | TEMPERATURE: 97.8 F | BODY MASS INDEX: 29.71 KG/M2 | DIASTOLIC BLOOD PRESSURE: 70 MMHG | HEART RATE: 96 BPM | HEIGHT: 64 IN | OXYGEN SATURATION: 97 %

## 2022-06-08 DIAGNOSIS — S76.919A MUSCLE STRAIN OF THIGH, INITIAL ENCOUNTER: Primary | ICD-10-CM

## 2022-06-08 DIAGNOSIS — R30.0 DYSURIA: ICD-10-CM

## 2022-06-08 DIAGNOSIS — N30.01 ACUTE CYSTITIS WITH HEMATURIA: ICD-10-CM

## 2022-06-08 LAB
BILIRUBIN, POC: ABNORMAL
BLOOD URINE, POC: ABNORMAL
CLARITY, POC: ABNORMAL
COLOR, POC: YELLOW
GLUCOSE URINE, POC: ABNORMAL
KETONES, POC: ABNORMAL
LEUKOCYTE EST, POC: ABNORMAL
NITRITE, POC: POSITIVE
PH, POC: 5
PROTEIN, POC: ABNORMAL
SPECIFIC GRAVITY, POC: >=1.03
UROBILINOGEN, POC: 0.2

## 2022-06-08 PROCEDURE — 81002 URINALYSIS NONAUTO W/O SCOPE: CPT | Performed by: NURSE PRACTITIONER

## 2022-06-08 PROCEDURE — 99214 OFFICE O/P EST MOD 30 MIN: CPT | Performed by: NURSE PRACTITIONER

## 2022-06-08 PROCEDURE — 96372 THER/PROPH/DIAG INJ SC/IM: CPT | Performed by: NURSE PRACTITIONER

## 2022-06-08 RX ORDER — METHYLPREDNISOLONE 4 MG/1
TABLET ORAL
Qty: 1 KIT | Refills: 0 | Status: SHIPPED | OUTPATIENT
Start: 2022-06-09 | End: 2022-06-14

## 2022-06-08 RX ORDER — TRIAMCINOLONE ACETONIDE 40 MG/ML
40 INJECTION, SUSPENSION INTRA-ARTICULAR; INTRAMUSCULAR ONCE
Status: COMPLETED | OUTPATIENT
Start: 2022-06-08 | End: 2022-06-08

## 2022-06-08 RX ORDER — CYCLOBENZAPRINE HCL 5 MG
5 TABLET ORAL 3 TIMES DAILY PRN
Qty: 30 TABLET | Refills: 0 | Status: SHIPPED | OUTPATIENT
Start: 2022-06-08 | End: 2022-06-18

## 2022-06-08 RX ORDER — KETOROLAC TROMETHAMINE 30 MG/ML
30 INJECTION, SOLUTION INTRAMUSCULAR; INTRAVENOUS ONCE
Status: COMPLETED | OUTPATIENT
Start: 2022-06-08 | End: 2022-06-08

## 2022-06-08 RX ORDER — NITROFURANTOIN 25; 75 MG/1; MG/1
100 CAPSULE ORAL 2 TIMES DAILY
Qty: 10 CAPSULE | Refills: 0 | Status: SHIPPED | OUTPATIENT
Start: 2022-06-08 | End: 2022-06-13

## 2022-06-08 RX ORDER — PHENAZOPYRIDINE HYDROCHLORIDE 100 MG/1
100 TABLET, FILM COATED ORAL 3 TIMES DAILY PRN
Qty: 6 TABLET | Refills: 0 | Status: SHIPPED | OUTPATIENT
Start: 2022-06-08 | End: 2022-06-10

## 2022-06-08 RX ADMIN — TRIAMCINOLONE ACETONIDE 40 MG: 40 INJECTION, SUSPENSION INTRA-ARTICULAR; INTRAMUSCULAR at 08:55

## 2022-06-08 RX ADMIN — KETOROLAC TROMETHAMINE 30 MG: 30 INJECTION, SOLUTION INTRAMUSCULAR; INTRAVENOUS at 08:54

## 2022-06-08 ASSESSMENT — PATIENT HEALTH QUESTIONNAIRE - PHQ9: DEPRESSION UNABLE TO ASSESS: PT REFUSES

## 2022-06-08 NOTE — PROGRESS NOTES
Chief Complaint:   Back Pain (pt fell yesterday) and Knee Pain      History of Present Illness   Source of history provided by:  patient. Micheline Bourgeois is a 44 y.o. old female who presents to the walk in clinic for evaluation of low back pain and bilateral posterior thigh pain that occurred 1 day ago. Patient reports that she was standing on a swivel chair hanging pictures and she fell directly on her buttocks. She did not lose consciousness. She did not hit her head. She has tried nothing over-the-counter for symptomatic relief. Pt states the pain is worse with movement and improves with lying flat. There is no radiation of the pain into the buttocks/leg. Denies red flag symptoms including fever, chills, sweats, weight loss, bowel/bladder incontinence, numbness, weakness or saddle anesthesia. Pt denies any abdominal pain, N/V/D, HA, neck pain, recent illness, dysuria, or lethargy. She has a additional complaint of dysuria x1 month. Previously patient was treated with cefdinir for a UTI, urine culture was positive for greater than 100,000 E. coli. She reports she has urinary frequency, urgency and burning with associated malodorous urine. She denies any fever, chills, flank pain, hematuria, chance of pregnancy or abdominal discomfort. Review of Systems   Unless otherwise stated in this report or unable to obtain because of the patient's clinical or mental status as evidenced by the medical record, this patients's positive and negative responses for Review of Systems, constitutional, psych, eyes, ENT, cardiovascular, respiratory, gastrointestinal, neurological, genitourinary, musculoskeletal, integument systems and systems related to the presenting problem are either stated in the preceding or were not pertinent or were negative for the symptoms and/or complaints related to the medical problem. Past Medical History:  has a past medical history of Epilepsy ([facility] [address] and Thyroid disease.    Past Surgical History:  has a past surgical history that includes back surgery and Foot surgery (Left, 7/2/2014). Social History:  reports that she has been smoking cigarettes. She has been smoking about 0.50 packs per day. She has never used smokeless tobacco. She reports current alcohol use. Family History: family history is not on file. Allergies: Amoxicillin-pot clavulanate, Hydrocodone-acetaminophen, and Vicodin [hydrocodone-acetaminophen]    Physical Exam   Vital Signs:  /70   Pulse 96   Temp 97.8 °F (36.6 °C) (Temporal)   Resp 18   Ht 5' 4\" (1.626 m)   Wt 174 lb (78.9 kg)   SpO2 97%   BMI 29.87 kg/m²    Oxygen Saturation Interpretation: Normal.    Physical Exam  Vitals and nursing note reviewed. Constitutional:       Appearance: Normal appearance. She is normal weight. HENT:      Head: Normocephalic and atraumatic. Right Ear: Tympanic membrane, ear canal and external ear normal.      Left Ear: Tympanic membrane, ear canal and external ear normal.      Nose: Nose normal.      Mouth/Throat:      Mouth: Mucous membranes are moist.      Pharynx: Oropharynx is clear. Eyes:      Extraocular Movements: Extraocular movements intact. Conjunctiva/sclera: Conjunctivae normal.      Pupils: Pupils are equal, round, and reactive to light. Cardiovascular:      Rate and Rhythm: Normal rate and regular rhythm. Pulses: Normal pulses. Heart sounds: Normal heart sounds. Pulmonary:      Effort: Pulmonary effort is normal.      Breath sounds: Normal breath sounds. No wheezing, rhonchi or rales. Abdominal:      General: Bowel sounds are normal. There is no distension. Palpations: Abdomen is soft. Tenderness: There is abdominal tenderness in the suprapubic area. There is no rebound. Musculoskeletal:         General: Normal range of motion. Cervical back: Normal range of motion and neck supple. Right upper leg: No tenderness or bony tenderness.       Left upper leg: No tenderness or bony tenderness. Right lower leg: No swelling. No edema. Left lower leg: No swelling. No edema. Comments: Decreased range of motion with hip flexion due to pain. Skin:     General: Skin is warm and dry. Capillary Refill: Capillary refill takes less than 2 seconds. Neurological:      General: No focal deficit present. Mental Status: She is alert and oriented to person, place, and time. Psychiatric:         Mood and Affect: Mood normal.         Behavior: Behavior normal.         Thought Content: Thought content normal.         Judgment: Judgment normal.           Test Results Section   (All laboratory and radiology results have been personally reviewed by myself)  Labs:  Results for orders placed or performed in visit on 06/08/22   POCT Urinalysis no Micro   Result Value Ref Range    Color, UA yellow     Clarity, UA cloudy     Glucose, UA POC neg     Bilirubin, UA neg     Ketones, UA trace     Spec Grav, UA >=1.030     Blood, UA POC trace-lysed     pH, UA 5.0     Protein, UA POC neg     Urobilinogen, UA 0.2     Leukocytes, UA neg     Nitrite, UA positive        Imaging: All Radiology results interpreted by Radiologist unless otherwise noted. Assessment / Plan   Impression(s):  Cruz Krueger was seen today for back pain and knee pain. Diagnoses and all orders for this visit:    Muscle strain of thigh, initial encounter  -     ketorolac (TORADOL) injection 30 mg  -     methylPREDNISolone (MEDROL DOSEPACK) 4 MG tablet; Take by mouth. -     triamcinolone acetonide (KENALOG-40) injection 40 mg  -     cyclobenzaprine (FLEXERIL) 5 MG tablet; Take 1 tablet by mouth 3 times daily as needed for Muscle spasms    Dysuria  -     POCT Urinalysis no Micro  -     Culture, Urine; Future    Acute cystitis with hematuria  -     nitrofurantoin, macrocrystal-monohydrate, (MACROBID) 100 MG capsule;  Take 1 capsule by mouth 2 times daily for 5 days  -     phenazopyridine (PYRIDIUM) 100 MG tablet; Take 1 tablet by mouth 3 times daily as needed for Pain      Kenalog and Toradol injection given today in office. Prescription written for Medrol Dosepak and Flexeril, side effects and administration instructions discussed. Avoid taking medication in conjunction with Soma or other sedatives. RICE therapy. UA in office appears positive, will call in Macrobid and Pyridium, side effects and administration instructions discussed. Increase fluids. Good pericare and hygiene were discussed. PCP in 1-2 weeks if symptoms persist. ED sooner if symptoms worsen or change. ED immediately with any red flag symptoms including fever, severe or worsening back pain, paresthesias, weakness, or GI/ incontinence. Pt states understanding and is in agreement with this care plan. All questions answered. No follow-ups on file.     TORI Simental NP

## 2022-06-10 LAB
ORGANISM: ABNORMAL
URINE CULTURE, ROUTINE: ABNORMAL

## 2022-07-26 ENCOUNTER — OFFICE VISIT (OUTPATIENT)
Dept: FAMILY MEDICINE CLINIC | Age: 37
End: 2022-07-26
Payer: OTHER GOVERNMENT

## 2022-07-26 VITALS
SYSTOLIC BLOOD PRESSURE: 118 MMHG | OXYGEN SATURATION: 97 % | DIASTOLIC BLOOD PRESSURE: 66 MMHG | HEART RATE: 105 BPM | HEIGHT: 64 IN | BODY MASS INDEX: 29.71 KG/M2 | RESPIRATION RATE: 18 BRPM | WEIGHT: 174 LBS | TEMPERATURE: 98.1 F

## 2022-07-26 DIAGNOSIS — Z02.89 ENCOUNTER TO OBTAIN EXCUSE FROM WORK: Primary | ICD-10-CM

## 2022-07-26 PROCEDURE — 99203 OFFICE O/P NEW LOW 30 MIN: CPT | Performed by: PHYSICIAN ASSISTANT

## 2022-07-26 ASSESSMENT — ENCOUNTER SYMPTOMS
BACK PAIN: 0
VOMITING: 0
DIARRHEA: 0
SORE THROAT: 0
NAUSEA: 0
COUGH: 0
ABDOMINAL PAIN: 0
PHOTOPHOBIA: 0
SHORTNESS OF BREATH: 0

## 2022-07-26 NOTE — LETTER
[facility]  [address] [place] [address]  Phone: [phone]  Fax: [phone]    [place], [place]        July 26, 2022     Patient: Nathan Hernandez   YOB: 1985   Date of Visit: 7/26/2022       To Whom it May Concern:    Devona Holter was seen in my clinic on 7/26/2022. She may return to work on 7/27/2022. If you have any questions or concerns, please don't hesitate to call.     Sincerely,         MICHELLE JUÁREZ

## 2022-07-26 NOTE — PROGRESS NOTES
22  Arti Fridge : 1985 Sex: female  Age 44 y.o. Subjective:  Chief Complaint   Patient presents with    Other         27-year-old female with a history of hypothyroidism, bipolar 1 disorder, insomnia, neuropathy, anxiety, fibromyalgia and seizures presents to the walk-in clinic to obtain a work excuse. Patient states about 12 days ago she had a breakthrough seizure for the first time in about 1 year and 3 months. She states it caused her to miss the subsequent Friday and Saturday of work. She states that her work would like an excuse for those days missed. She states that on 7/15 she talk to her neurologist at the [facility]. They increased her Lyrica. Patient states today she was at work and while staring at the computer she felt like she was going to have another seizure. She states she did not feel safe staying at work. She states because she had to leave she again needs a work excuse. Patient does admit to having cold-like symptoms for the last 10 days. She states that her daughter has tested negative for COVID-19. She states that her  had similar symptoms to but he did not test either. Patient denies any chance of pregnancy. She denies any headache. No facial droop or slurring speech. She denies lateralizing weakness. No visual disturbances. Review of Systems   Constitutional:  Negative for chills and fever. HENT:  Negative for congestion, ear pain and sore throat. Eyes:  Negative for photophobia and visual disturbance. Respiratory:  Negative for cough and shortness of breath. Cardiovascular:  Negative for chest pain. Gastrointestinal:  Negative for abdominal pain, diarrhea, nausea and vomiting. Genitourinary:  Negative for difficulty urinating, dysuria, frequency and urgency. Musculoskeletal:  Negative for back pain, neck pain and neck stiffness. Skin:  Negative for rash.    Neurological:  Negative for dizziness, syncope, weakness, light-headedness and headaches. Hematological:  Negative for adenopathy. Does not bruise/bleed easily. Psychiatric/Behavioral:  Negative for agitation and confusion. All other systems reviewed and are negative. PMH:     Past Medical History:   Diagnosis Date    Epilepsy ([address]     Thyroid disease        Past Surgical History:   Procedure Laterality Date    BACK SURGERY      FOOT SURGERY Left 7/2/2014    i and d with biopsy and removal of foreign body       History reviewed. No pertinent family history. Medications:     Current Outpatient Medications:     lamoTRIgine (LAMICTAL) 100 MG tablet, TAKE 1 TABLET BY MOUTH TWICE DAILY, Disp: , Rfl:     diclofenac (VOLTAREN) 75 MG EC tablet, TAKE 1 TABLET BY MOUTH TWICE DAILY WITH FOOD AS NEEDED FOR PAIN, Disp: , Rfl:     lamoTRIgine (LAMICTAL) 25 MG tablet, , Disp: , Rfl:     ARIPiprazole (ABILIFY) 2 MG tablet, , Disp: , Rfl:     FLUoxetine (PROZAC) 40 MG capsule, 60 mg , Disp: , Rfl:     levETIRAcetam (KEPPRA) 500 MG tablet, TAKE 1 TABLET BY MOUTH EVERY MORNING AND 2 TABLETS EVERY EVENING, Disp: , Rfl:     pregabalin (LYRICA) 150 MG capsule, Take 1 capsule by mouth 2 times daily for 30 days. , Disp: 60 capsule, Rfl: 2    levothyroxine (SYNTHROID) 25 MCG tablet, TAKE 1 TABLET BY MOUTH DAILY, Disp: 90 tablet, Rfl: 1    Allergies: Allergies   Allergen Reactions    Amoxicillin-Pot Clavulanate Hives    Hydrocodone-Acetaminophen      Other reaction(s): Unknown    Vicodin [Hydrocodone-Acetaminophen]        Social History:     Social History     Tobacco Use    Smoking status: Every Day     Packs/day: 0.50     Types: Cigarettes    Smokeless tobacco: Never   Vaping Use    Vaping Use: Every day    Substances: Nicotine    Devices: Pre-filled or refillable cartridge, Refillable tank   Substance Use Topics    Alcohol use: Yes     Comment: occasionally       Patient lives at home.     Physical Exam:     Vitals:    07/26/22 1118   BP: 118/66   Pulse: (!) 105   Resp: 18 Temp: 98.1 °F (36.7 °C)   TempSrc: Temporal   SpO2: 97%   Weight: 174 lb (78.9 kg)   Height: 5' 4\" (1.626 m)       Exam:  Physical Exam  Vitals and nursing note reviewed. Constitutional:       General: She is not in acute distress. Appearance: She is well-developed. HENT:      Head: Normocephalic and atraumatic. Right Ear: Tympanic membrane normal.      Left Ear: Tympanic membrane normal.      Nose: Nose normal.      Mouth/Throat:      Mouth: Mucous membranes are moist.      Pharynx: Oropharynx is clear. Eyes:      Conjunctiva/sclera: Conjunctivae normal.      Pupils: Pupils are equal, round, and reactive to light. Cardiovascular:      Rate and Rhythm: Normal rate and regular rhythm. Pulmonary:      Effort: Pulmonary effort is normal. No respiratory distress. Breath sounds: Normal breath sounds. Abdominal:      General: Bowel sounds are normal.      Palpations: Abdomen is soft. Tenderness: There is no abdominal tenderness. Musculoskeletal:         General: Normal range of motion. Cervical back: Normal range of motion. No rigidity. Lymphadenopathy:      Cervical: No cervical adenopathy. Skin:     General: Skin is warm and dry. Neurological:      General: No focal deficit present. Mental Status: She is alert and oriented to person, place, and time. Cranial Nerves: No cranial nerve deficit. Sensory: No sensory deficit. Motor: No weakness. Coordination: Coordination normal.      Gait: Gait normal.      Deep Tendon Reflexes: Reflexes normal.   Psychiatric:         Mood and Affect: Mood normal.         Behavior: Behavior normal.         Thought Content: Thought content normal.         Judgment: Judgment normal.         Testing:           Medical Decision Making:       Patient upon arrival did not appear toxic or lethargic. Vital signs were reviewed. Past medical history reviewed. Allergies reviewed. Medications reviewed.      Patient is presenting for a work excuse. I informed the patient that I would not be writing her a work note to excuse her for Friday and Saturday 2 weeks ago because she was not seen or evaluated in our office at that time. She will be given an excuse for today. I discussed that if she has another breakthrough seizure to please follow-up with her neurologist.  Patient understands a plan is agreeable. Clinical Impression:   Radha Olivera was seen today for other. Diagnoses and all orders for this visit:    Encounter to obtain excuse from work      The patient is to call for any concerns or return if any of the signs or symptoms worsen. The patient is to follow-up with PCP in the next 2-3 days for repeat evaluation repeat assessment or go directly to the emergency department. SIGNATURE: Tg Matute PA-C

## 2022-08-11 DIAGNOSIS — G47.00 INSOMNIA, UNSPECIFIED TYPE: ICD-10-CM

## 2022-08-11 RX ORDER — CARISOPRODOL 350 MG/1
350 TABLET ORAL DAILY
Qty: 30 TABLET | Refills: 0 | Status: SHIPPED
Start: 2022-08-11 | End: 2022-09-27 | Stop reason: SDUPTHER

## 2022-09-11 DIAGNOSIS — G47.00 INSOMNIA, UNSPECIFIED TYPE: ICD-10-CM

## 2022-09-12 RX ORDER — CARISOPRODOL 350 MG/1
TABLET ORAL
Qty: 30 TABLET | OUTPATIENT
Start: 2022-09-12

## 2022-09-12 NOTE — TELEPHONE ENCOUNTER
This is a controlled substance. Patient needs a follow-up appointment prior to refilling this prescription.

## 2022-09-12 NOTE — TELEPHONE ENCOUNTER
Left message for patient letting her know she would need to set up an appointment before medications can be refilled.

## 2022-09-27 ENCOUNTER — OFFICE VISIT (OUTPATIENT)
Dept: PRIMARY CARE CLINIC | Age: 37
End: 2022-09-27
Payer: OTHER GOVERNMENT

## 2022-09-27 VITALS
WEIGHT: 189 LBS | HEIGHT: 64 IN | OXYGEN SATURATION: 98 % | TEMPERATURE: 97.5 F | RESPIRATION RATE: 16 BRPM | DIASTOLIC BLOOD PRESSURE: 70 MMHG | HEART RATE: 99 BPM | BODY MASS INDEX: 32.27 KG/M2 | SYSTOLIC BLOOD PRESSURE: 122 MMHG

## 2022-09-27 DIAGNOSIS — M54.2 CERVICALGIA: ICD-10-CM

## 2022-09-27 DIAGNOSIS — M54.41 CHRONIC BILATERAL LOW BACK PAIN WITH BILATERAL SCIATICA: ICD-10-CM

## 2022-09-27 DIAGNOSIS — M54.42 CHRONIC BILATERAL LOW BACK PAIN WITH BILATERAL SCIATICA: ICD-10-CM

## 2022-09-27 DIAGNOSIS — Z23 NEED FOR VACCINATION: ICD-10-CM

## 2022-09-27 DIAGNOSIS — G47.00 INSOMNIA, UNSPECIFIED TYPE: ICD-10-CM

## 2022-09-27 DIAGNOSIS — G89.29 CHRONIC BILATERAL LOW BACK PAIN WITH BILATERAL SCIATICA: ICD-10-CM

## 2022-09-27 DIAGNOSIS — E03.9 HYPOTHYROIDISM, UNSPECIFIED TYPE: ICD-10-CM

## 2022-09-27 DIAGNOSIS — G62.9 NEUROPATHY: ICD-10-CM

## 2022-09-27 DIAGNOSIS — K21.9 GASTROESOPHAGEAL REFLUX DISEASE WITHOUT ESOPHAGITIS: ICD-10-CM

## 2022-09-27 DIAGNOSIS — F51.01 PRIMARY INSOMNIA: ICD-10-CM

## 2022-09-27 DIAGNOSIS — M79.7 FIBROMYALGIA: Primary | ICD-10-CM

## 2022-09-27 PROCEDURE — 90471 IMMUNIZATION ADMIN: CPT | Performed by: NURSE PRACTITIONER

## 2022-09-27 PROCEDURE — 99214 OFFICE O/P EST MOD 30 MIN: CPT | Performed by: NURSE PRACTITIONER

## 2022-09-27 PROCEDURE — 90674 CCIIV4 VAC NO PRSV 0.5 ML IM: CPT | Performed by: NURSE PRACTITIONER

## 2022-09-27 RX ORDER — OMEPRAZOLE 20 MG/1
20 CAPSULE, DELAYED RELEASE ORAL
Qty: 30 CAPSULE | Refills: 5 | Status: SHIPPED
Start: 2022-09-27 | End: 2022-09-27

## 2022-09-27 RX ORDER — OMEPRAZOLE 20 MG/1
CAPSULE, DELAYED RELEASE ORAL
Qty: 90 CAPSULE | Refills: 1 | Status: SHIPPED | OUTPATIENT
Start: 2022-09-27

## 2022-09-27 RX ORDER — CLONAZEPAM 0.5 MG/1
TABLET ORAL
COMMUNITY
Start: 2022-08-11

## 2022-09-27 RX ORDER — CARISOPRODOL 350 MG/1
350 TABLET ORAL DAILY
Qty: 30 TABLET | Refills: 2 | Status: SHIPPED | OUTPATIENT
Start: 2022-09-27 | End: 2022-10-27

## 2022-09-27 SDOH — ECONOMIC STABILITY: FOOD INSECURITY: WITHIN THE PAST 12 MONTHS, YOU WORRIED THAT YOUR FOOD WOULD RUN OUT BEFORE YOU GOT MONEY TO BUY MORE.: SOMETIMES TRUE

## 2022-09-27 SDOH — ECONOMIC STABILITY: FOOD INSECURITY: WITHIN THE PAST 12 MONTHS, THE FOOD YOU BOUGHT JUST DIDN'T LAST AND YOU DIDN'T HAVE MONEY TO GET MORE.: SOMETIMES TRUE

## 2022-09-27 ASSESSMENT — PATIENT HEALTH QUESTIONNAIRE - PHQ9
3. TROUBLE FALLING OR STAYING ASLEEP: 2
8. MOVING OR SPEAKING SO SLOWLY THAT OTHER PEOPLE COULD HAVE NOTICED. OR THE OPPOSITE, BEING SO FIGETY OR RESTLESS THAT YOU HAVE BEEN MOVING AROUND A LOT MORE THAN USUAL: 0
SUM OF ALL RESPONSES TO PHQ9 QUESTIONS 1 & 2: 0
SUM OF ALL RESPONSES TO PHQ QUESTIONS 1-9: 2
6. FEELING BAD ABOUT YOURSELF - OR THAT YOU ARE A FAILURE OR HAVE LET YOURSELF OR YOUR FAMILY DOWN: 0
5. POOR APPETITE OR OVEREATING: 0
2. FEELING DOWN, DEPRESSED OR HOPELESS: 0
9. THOUGHTS THAT YOU WOULD BE BETTER OFF DEAD, OR OF HURTING YOURSELF: 0
4. FEELING TIRED OR HAVING LITTLE ENERGY: 0
7. TROUBLE CONCENTRATING ON THINGS, SUCH AS READING THE NEWSPAPER OR WATCHING TELEVISION: 0
SUM OF ALL RESPONSES TO PHQ QUESTIONS 1-9: 2
1. LITTLE INTEREST OR PLEASURE IN DOING THINGS: 0

## 2022-09-27 ASSESSMENT — SOCIAL DETERMINANTS OF HEALTH (SDOH): HOW HARD IS IT FOR YOU TO PAY FOR THE VERY BASICS LIKE FOOD, HOUSING, MEDICAL CARE, AND HEATING?: HARD

## 2022-09-27 ASSESSMENT — ENCOUNTER SYMPTOMS
COUGH: 0
ABDOMINAL PAIN: 0
NAUSEA: 1
FACIAL SWELLING: 0
DIARRHEA: 0
BACK PAIN: 1
SHORTNESS OF BREATH: 0
WHEEZING: 0
VOICE CHANGE: 0
CHEST TIGHTNESS: 0
EYES NEGATIVE: 1
ABDOMINAL DISTENTION: 0
RHINORRHEA: 0
VOMITING: 0
COLOR CHANGE: 0
CONSTIPATION: 0
APNEA: 0

## 2022-09-27 NOTE — PROGRESS NOTES
2022     Gita Rehman 44 y.o. female    : 1985    Chief Complaint:   Medication Refill and Hypothyroidism       History of Present Illness:   Dana Goodwin is a 44 y.o. female who presents to the office for follow up on her chronic problems. She has a pertinent past medical history for epilepsy, anxiety, chronic low back pain, seizures and fibromyalgia. She does follow with neurology at [facility], Dr. Calixto Weldon. MRI of the brain was unremarkable. SUZAN positive. EEG revealed frequent spikes. She is currently taking Keppra and per patient she was initiated on another medication that she cannot recall the name. Her last seizure was approximately 2 months ago. Anxiety/depression- She has established with psychiatry, Cynthia Molina a few pills her medications. She is taking her medications as prescribed. Insomnia is controlled with Soma. She reports that she has been out of this for approximately a week and she has been having recurrent symptoms and poor sleep. Fibromyalgia was diagnosed in . She is currently taking Lyrica with relief of symptoms. She is always in pain. Back pain has significantly worsened. She does report bilateral radiculopathy symptoms. Denies any bowel or urinary incontinence, weakness or paresthesias. She did have a lumbar spine fusion in 2013. She established with Dr. Be Funk, unfortunately she had missed numerous appointments and they dismissed her from the practice. She is requesting a referral to a new pain management doctor at this time. She has an additional complaint of poorly controlled acid reflux. She has been taking Tums over-the-counter with minimal relief of symptoms. She reports that symptoms occur on a daily basis. She does attempt to avoid food triggers and denies eating large meals prior to bedtime. She  The patient is not up-to-date with health maintenance screenings. Previous lab work was reviewed.     Past Medical History:     Past Medical History:   Diagnosis Date    Epilepsy ([address]     Thyroid disease        Past Surgical History:   Procedure Laterality Date    BACK SURGERY      FOOT SURGERY Left 7/2/2014    i and d with biopsy and removal of foreign body       History reviewed. No pertinent family history. Social History     Tobacco Use    Smoking status: Every Day     Packs/day: 0.50     Types: Cigarettes    Smokeless tobacco: Never   Vaping Use    Vaping Use: Every day    Substances: Nicotine    Devices: Pre-filled or refillable cartridge, Refillable tank   Substance Use Topics    Alcohol use: Yes     Comment: occasionally       Medications:     Current Outpatient Medications:     carisoprodol (SOMA) 350 MG tablet, Take 1 tablet by mouth daily for 30 days. , Disp: 30 tablet, Rfl: 2    diclofenac (VOLTAREN) 75 MG EC tablet, TAKE 1 TABLET BY MOUTH TWICE DAILY WITH FOOD AS NEEDED FOR PAIN, Disp: , Rfl:     ARIPiprazole (ABILIFY) 2 MG tablet, , Disp: , Rfl:     FLUoxetine (PROZAC) 40 MG capsule, 60 mg , Disp: , Rfl:     levETIRAcetam (KEPPRA) 500 MG tablet, TAKE 1 TABLET BY MOUTH EVERY MORNING AND 2 TABLETS EVERY EVENING, Disp: , Rfl:     clonazePAM (KLONOPIN) 0.5 MG tablet, TAKE 1 TABLET BY MOUTH THREE TIMES DAILY, Disp: , Rfl:     omeprazole (PRILOSEC) 20 MG delayed release capsule, TAKE 1 CAPSULE BY MOUTH EVERY MORNING BEFORE BREAKFAST, Disp: 90 capsule, Rfl: 1    pregabalin (LYRICA) 150 MG capsule, Take 1 capsule by mouth 2 times daily for 30 days. , Disp: 60 capsule, Rfl: 2    levothyroxine (SYNTHROID) 25 MCG tablet, TAKE 1 TABLET BY MOUTH DAILY, Disp: 90 tablet, Rfl: 1    Allergies   Allergen Reactions    Amoxicillin-Pot Clavulanate Hives    Vicodin [Hydrocodone-Acetaminophen]     Hydrocodone-Acetaminophen Nausea And Vomiting     Other reaction(s): Unknown       Review of Systems:   Review of Systems   Constitutional:  Negative for activity change, appetite change, fatigue, fever and unexpected weight change.    HENT: Extraocular Movements: Extraocular movements intact. Conjunctiva/sclera: Conjunctivae normal.      Pupils: Pupils are equal, round, and reactive to light. Cardiovascular:      Rate and Rhythm: Normal rate and regular rhythm. Pulses: Normal pulses. Heart sounds: Normal heart sounds. Pulmonary:      Effort: Pulmonary effort is normal.      Breath sounds: Normal breath sounds. No wheezing, rhonchi or rales. Abdominal:      General: Bowel sounds are normal. There is no distension. Palpations: Abdomen is soft. Tenderness: There is no abdominal tenderness. There is no rebound. Musculoskeletal:         General: Normal range of motion. Cervical back: Normal range of motion and neck supple. Lumbar back: No bony tenderness. Normal range of motion. Negative right straight leg raise test and negative left straight leg raise test.      Comments: No midline tenderness. There is tenderness with palpation to the paraspinal region of the lumbar spine. Skin:     General: Skin is warm and dry. Capillary Refill: Capillary refill takes less than 2 seconds. Neurological:      General: No focal deficit present. Mental Status: She is alert and oriented to person, place, and time. Psychiatric:         Mood and Affect: Mood normal. Affect is flat. Behavior: Behavior normal.         Thought Content:  Thought content normal.         Judgment: Judgment normal.       Health Maintenance:     Health Maintenance   Topic Date Due    COVID-19 Vaccine (1) Never done    Varicella vaccine (1 of 2 - 2-dose childhood series) Never done    Pneumococcal 0-64 years Vaccine (1 - PCV) Never done    Depression Monitoring  09/20/2022    DTaP/Tdap/Td vaccine (2 - Td or Tdap) 07/02/2024    Cervical cancer screen  01/20/2025    Flu vaccine  Completed    Hepatitis C screen  Completed    HIV screen  Completed    Hepatitis A vaccine  Aged Out    Hepatitis B vaccine  Aged Out    Hib vaccine  Aged Out Meningococcal (ACWY) vaccine  Aged Out        Immunization History   Administered Date(s) Administered    Influenza, FLUCELVAX, (age 10 mo+), MDCK, PF, 0.5mL 10/14/2021, 09/27/2022    Tdap (Boostrix, Adacel) 07/02/2014        Testing: All laboratory and radiology results have been personally reviewed by myself. Labs:  No results found for this visit on 09/27/22. Imaging: All Radiology results interpreted by Radiologist unless otherwise noted. No results found. Assessment/Plan:   I personally reviewed the patient's allergies, past medical history, medications, and vitals sign. Cash Pickering was seen today for medication refill and hypothyroidism. Diagnoses and all orders for this visit:    Fibromyalgia  -     External Referral To Pain Clinic  -     CBC; Future  -     Comprehensive Metabolic Panel; Future  -     Lipid Panel; Future  -     TSH; Future    Cervicalgia  -     External Referral To Pain Clinic    Chronic bilateral low back pain with bilateral sciatica  -     External Referral To Pain Clinic    Neuropathy  -     External Referral To Pain Clinic    Insomnia, unspecified type  -     carisoprodol (SOMA) 350 MG tablet; Take 1 tablet by mouth daily for 30 days. Primary insomnia  -     carisoprodol (SOMA) 350 MG tablet; Take 1 tablet by mouth daily for 30 days. Need for vaccination  -     Influenza, FLUCELVAX, (age 10 mo+), IM, Preservative Free, 0.5 mL    BMI 32.0-32.9,adult  -     Vitamin D 25 Hydroxy; Future    Hypothyroidism, unspecified type  -     CBC; Future  -     Comprehensive Metabolic Panel; Future  -     Lipid Panel; Future  -     TSH; Future    Gastroesophageal reflux disease without esophagitis    Other orders  -     Discontinue: omeprazole (PRILOSEC) 20 MG delayed release capsule; Take 1 capsule by mouth every morning (before breakfast)      Continue all medications as prescribed, side effects discussed.   Keep follow-up appointments with specialist.  Prescription written for Cynthia Singer report reviewed and appropriate. Patient is having symptoms of poorly controlled GERD, will initiate omeprazole, side effects and administration instructions discussed. Avoid food triggers and large meals prior to bedtime. Patient is agreeable to influenza vaccine today. Referral placed to pain management. Increase activity as tolerated and follow a healthy diet. Call or go to ED immediately if symptoms worsen or persist.  Return in about 3 months (around 12/27/2022) for 3-month follow-up. Sooner if necessary. Counseled regarding above diagnosis, including possible risks and complications,especially if left uncontrolled. Counseled regarding the possible side effects, risks, benefits and alternatives to treatment; patient and/or guardian verbalizes understanding. Advised patient to call with any new medication issues. All questions answered. Reviewed age and gender appropriate health screening exams and vaccinations. Advised patient regarding importance of keeping up with recommended health maintenance and to schedule as soon as possible if overdue, as this is important in assessing for undiagnosed pathology, especially cancer. Patient verbalizes understanding and agrees. TORI Flores - NP     **This report was transcribed using voice recognition software. Every effort was made to ensure accuracy; however, inadvertent computerized transcription errors may be present.

## 2022-10-24 ENCOUNTER — OFFICE VISIT (OUTPATIENT)
Dept: FAMILY MEDICINE CLINIC | Age: 37
End: 2022-10-24
Payer: OTHER GOVERNMENT

## 2022-10-24 VITALS
TEMPERATURE: 97.5 F | OXYGEN SATURATION: 98 % | SYSTOLIC BLOOD PRESSURE: 120 MMHG | RESPIRATION RATE: 18 BRPM | DIASTOLIC BLOOD PRESSURE: 80 MMHG | BODY MASS INDEX: 32.27 KG/M2 | HEIGHT: 64 IN | HEART RATE: 118 BPM | WEIGHT: 189 LBS

## 2022-10-24 DIAGNOSIS — F41.9 ANXIETY: Primary | ICD-10-CM

## 2022-10-24 PROCEDURE — 99214 OFFICE O/P EST MOD 30 MIN: CPT | Performed by: NURSE PRACTITIONER

## 2022-10-24 RX ORDER — ARIPIPRAZOLE 2 MG/1
2 TABLET ORAL DAILY
Qty: 30 TABLET | Refills: 0 | Status: SHIPPED | OUTPATIENT
Start: 2022-10-24 | End: 2022-11-23

## 2022-10-24 RX ORDER — BUSPIRONE HYDROCHLORIDE 5 MG/1
5 TABLET ORAL 2 TIMES DAILY
Qty: 60 TABLET | Refills: 0 | Status: SHIPPED | OUTPATIENT
Start: 2022-10-24 | End: 2022-11-23

## 2022-10-24 ASSESSMENT — ENCOUNTER SYMPTOMS
CHEST TIGHTNESS: 0
CONSTIPATION: 0
BACK PAIN: 0
FACIAL SWELLING: 0
APNEA: 0
DIARRHEA: 0
VOICE CHANGE: 0
RHINORRHEA: 0
EYES NEGATIVE: 1
NAUSEA: 0
ABDOMINAL DISTENTION: 0
VOMITING: 0
WHEEZING: 0
SHORTNESS OF BREATH: 0
ABDOMINAL PAIN: 0
COLOR CHANGE: 0
COUGH: 0

## 2022-10-24 NOTE — PROGRESS NOTES
2022     Anthony Rehman 44 y.o. female   : 1985  Chief Complaint:   Anxiety       History of Present Illness:   Albin Cornejo is a 44 y.o. female who presents to the office with complaints of poorly controlled anxiety that has worsened over the last several days. She currently does follow with psychiatry who manages her medications. Her last visit was approximately 6 weeks ago when she goes every 3 months. She reports that there is no obvious trigger to her heightened anxiety. She has abruptly stopped her Abilify as she ran out of this prescription. She denies any heart palpitations, shortness of breath or chest pain. She denies any suicidal or homicidal ideation. .      Past Medical History:     Past Medical History:   Diagnosis Date    Epilepsy ([address]     Thyroid disease        Past Surgical History:   Procedure Laterality Date    BACK SURGERY      FOOT SURGERY Left 2014    i and d with biopsy and removal of foreign body       History reviewed. No pertinent family history. Social History     Tobacco Use    Smoking status: Every Day     Packs/day: 0.50     Types: Cigarettes    Smokeless tobacco: Never   Vaping Use    Vaping Use: Every day    Substances: Nicotine    Devices: Pre-filled or refillable cartridge, Refillable tank   Substance Use Topics    Alcohol use: Yes     Comment: occasionally       Medications:     Current Outpatient Medications:     ARIPiprazole (ABILIFY) 2 MG tablet, Take 1 tablet by mouth daily, Disp: 30 tablet, Rfl: 0    busPIRone (BUSPAR) 5 MG tablet, Take 1 tablet by mouth 2 times daily, Disp: 60 tablet, Rfl: 0    clonazePAM (KLONOPIN) 0.5 MG tablet, TAKE 1 TABLET BY MOUTH THREE TIMES DAILY, Disp: , Rfl:     carisoprodol (SOMA) 350 MG tablet, Take 1 tablet by mouth daily for 30 days. , Disp: 30 tablet, Rfl: 2    omeprazole (PRILOSEC) 20 MG delayed release capsule, TAKE 1 CAPSULE BY MOUTH EVERY MORNING BEFORE BREAKFAST, Disp: 90 capsule, Rfl: 1    diclofenac (VOLTAREN) 75 MG EC tablet, TAKE 1 TABLET BY MOUTH TWICE DAILY WITH FOOD AS NEEDED FOR PAIN, Disp: , Rfl:     FLUoxetine (PROZAC) 40 MG capsule, 60 mg , Disp: , Rfl:     levETIRAcetam (KEPPRA) 500 MG tablet, TAKE 1 TABLET BY MOUTH EVERY MORNING AND 2 TABLETS EVERY EVENING, Disp: , Rfl:     pregabalin (LYRICA) 150 MG capsule, Take 1 capsule by mouth 2 times daily for 30 days. , Disp: 60 capsule, Rfl: 2    levothyroxine (SYNTHROID) 25 MCG tablet, TAKE 1 TABLET BY MOUTH DAILY, Disp: 90 tablet, Rfl: 1    Allergies   Allergen Reactions    Amoxicillin-Pot Clavulanate Hives    Vicodin [Hydrocodone-Acetaminophen]     Hydrocodone-Acetaminophen Nausea And Vomiting     Other reaction(s): Unknown       Review of Systems:   Review of Systems   Constitutional:  Negative for activity change, appetite change, fatigue, fever and unexpected weight change. HENT:  Negative for congestion, facial swelling, mouth sores, postnasal drip, rhinorrhea, sneezing, tinnitus and voice change. Eyes: Negative. Respiratory:  Negative for apnea, cough, chest tightness, shortness of breath and wheezing. Cardiovascular:  Negative for chest pain, palpitations and leg swelling. Gastrointestinal:  Negative for abdominal distention, abdominal pain, constipation, diarrhea, nausea and vomiting. Endocrine: Negative for cold intolerance and heat intolerance. Genitourinary:  Negative for difficulty urinating, dysuria, flank pain, frequency, pelvic pain and urgency. Musculoskeletal:  Negative for back pain, gait problem, joint swelling, myalgias and neck pain. Skin:  Negative for color change, pallor, rash and wound. Allergic/Immunologic: Negative for environmental allergies and food allergies. Neurological:  Negative for dizziness, tremors, seizures, syncope, facial asymmetry, speech difficulty, weakness, light-headedness, numbness and headaches.    Psychiatric/Behavioral:  Negative for agitation, behavioral problems, confusion, decreased concentration, dysphoric mood, sleep disturbance and suicidal ideas. The patient is nervous/anxious. The patient is not hyperactive. Physical Exam:   Vital Signs:  /80   Pulse (!) 118   Temp 97.5 °F (36.4 °C) (Temporal)   Resp 18   Ht 5' 4\" (1.626 m)   Wt 189 lb (85.7 kg)   SpO2 98%   BMI 32.44 kg/m²    Oxygen Saturation Interpretation: Normal.    Physical Exam  Vitals and nursing note reviewed. Constitutional:       Appearance: Normal appearance. She is normal weight. HENT:      Head: Normocephalic and atraumatic. Right Ear: Tympanic membrane, ear canal and external ear normal.      Left Ear: Tympanic membrane, ear canal and external ear normal.      Nose: Nose normal.      Mouth/Throat:      Mouth: Mucous membranes are moist.      Pharynx: Oropharynx is clear. Eyes:      Extraocular Movements: Extraocular movements intact. Conjunctiva/sclera: Conjunctivae normal.      Pupils: Pupils are equal, round, and reactive to light. Cardiovascular:      Rate and Rhythm: Normal rate and regular rhythm. Pulses: Normal pulses. Heart sounds: Normal heart sounds. Pulmonary:      Effort: Pulmonary effort is normal.      Breath sounds: Normal breath sounds. No wheezing, rhonchi or rales. Abdominal:      General: Bowel sounds are normal. There is no distension. Palpations: Abdomen is soft. Tenderness: There is no abdominal tenderness. There is no rebound. Musculoskeletal:         General: Normal range of motion. Cervical back: Normal range of motion and neck supple. Skin:     General: Skin is warm and dry. Capillary Refill: Capillary refill takes less than 2 seconds. Neurological:      General: No focal deficit present. Mental Status: She is alert and oriented to person, place, and time. Psychiatric:         Mood and Affect: Mood normal. Affect is flat. Behavior: Behavior normal.         Thought Content:  Thought content normal. Judgment: Judgment normal.         Testing: All laboratory and radiology results have been personally reviewed by myself. Labs:  No results found for this visit on 10/24/22. Imaging: All Radiology results interpreted by Radiologist unless otherwise noted. No results found. Assessment/Plan:   I personally reviewed the patient's allergies, past medical history, medications, and vitals sign. Jonny Morton was seen today for anxiety. Diagnoses and all orders for this visit:    Anxiety  -     ARIPiprazole (ABILIFY) 2 MG tablet; Take 1 tablet by mouth daily  -     External Referral To Counseling Services  -     busPIRone (BUSPAR) 5 MG tablet; Take 1 tablet by mouth 2 times daily      Anxiety is poorly controlled. She does have a flat affect in the office, denies any suicidal or homicidal ideations. Patient is to follow-up with psychiatry as soon as possible. She is agreeable to counseling, referral placed. Will reinitiate Abilify and start BuSpar, side effects and administration instructions were discussed. [facility] report reviewed and appropriate. Other therapeutic techniques were discussed including relaxation. Call or go to ED immediately if symptoms worsen or persist.  F/U in 1 month. Sooner if necessary. Counseled regarding above diagnosis, including possible risks and complications,especially if left uncontrolled. Counseled regarding the possible side effects, risks, benefits and alternatives to treatment; patient and/or guardian verbalizes understanding. Advised patient to call with any new medication issues. All questions answered.     TORI eG - JOANNA

## 2022-12-21 ENCOUNTER — OFFICE VISIT (OUTPATIENT)
Dept: FAMILY MEDICINE CLINIC | Age: 37
End: 2022-12-21

## 2022-12-21 VITALS
HEART RATE: 104 BPM | SYSTOLIC BLOOD PRESSURE: 132 MMHG | WEIGHT: 189 LBS | RESPIRATION RATE: 20 BRPM | DIASTOLIC BLOOD PRESSURE: 74 MMHG | BODY MASS INDEX: 32.27 KG/M2 | OXYGEN SATURATION: 99 % | TEMPERATURE: 97.3 F | HEIGHT: 64 IN

## 2022-12-21 DIAGNOSIS — N30.00 ACUTE CYSTITIS WITHOUT HEMATURIA: Primary | ICD-10-CM

## 2022-12-21 DIAGNOSIS — R30.0 DYSURIA: ICD-10-CM

## 2022-12-21 LAB
BILIRUBIN, POC: ABNORMAL
BLOOD URINE, POC: ABNORMAL
CLARITY, POC: CLEAR
COLOR, POC: YELLOW
GLUCOSE URINE, POC: ABNORMAL
KETONES, POC: ABNORMAL
LEUKOCYTE EST, POC: ABNORMAL
NITRITE, POC: ABNORMAL
PH, POC: 7
PROTEIN, POC: ABNORMAL
SPECIFIC GRAVITY, POC: 1.02
UROBILINOGEN, POC: 0.2

## 2022-12-21 RX ORDER — VORTIOXETINE 10 MG/1
TABLET, FILM COATED ORAL
COMMUNITY
Start: 2022-12-01

## 2022-12-21 RX ORDER — PREGABALIN 200 MG/1
CAPSULE ORAL
COMMUNITY
Start: 2022-12-13

## 2022-12-21 RX ORDER — TOPIRAMATE 50 MG/1
100 TABLET, FILM COATED ORAL 2 TIMES DAILY
COMMUNITY
Start: 2022-12-14 | End: 2023-03-14

## 2022-12-21 RX ORDER — CEPHALEXIN 500 MG/1
500 CAPSULE ORAL 2 TIMES DAILY
Qty: 14 CAPSULE | Refills: 0 | Status: SHIPPED | OUTPATIENT
Start: 2022-12-21 | End: 2022-12-28

## 2022-12-21 RX ORDER — PHENAZOPYRIDINE HYDROCHLORIDE 100 MG/1
100 TABLET, FILM COATED ORAL 3 TIMES DAILY PRN
Qty: 6 TABLET | Refills: 0 | Status: SHIPPED | OUTPATIENT
Start: 2022-12-21 | End: 2022-12-23

## 2022-12-21 NOTE — PROGRESS NOTES
2022     Anthony Rehman 44 y.o. female    : 1985  Chief Complaint:   Dysuria      History of Present Illness   Source of history provided by:  patient. Albin Cornejo is a 44 y.o. old female who presents to the [facility] with complaints of dysuria x 1 days. Reports associated frequency, urgency, and suprapubic pressure. Denies gross hematuria. Denies associated flank pain. Denies any fever, chills, vaginal discharge, vaginal bleeding, possibility of pregnancy, vomiting, diarrhea, or lethargy. No LMP recorded. She does have a history of recurrent UTIs. Previous urine cultures were positive for greater than 100 K E. coli. She reports that these usually happen postcoitally. ROS   Past Medical History:   Past Medical History:   Diagnosis Date    Epilepsy ([address]     Thyroid disease      Past Surgical History:   Past Surgical History:   Procedure Laterality Date    BACK SURGERY      FOOT SURGERY Left 2014    i and d with biopsy and removal of foreign body     Social History:  reports that she has been smoking cigarettes. She has been smoking an average of .5 packs per day. She has never used smokeless tobacco. She reports current alcohol use. Family History: family history is not on file. Allergies: Amoxicillin-pot clavulanate, Vicodin [hydrocodone-acetaminophen], and Hydrocodone-acetaminophen    Unless otherwise stated in this report the patient's positive and negative responses for review of systems for constitutional, eyes, ENT, cardiovascular, respiratory, gastrointestinal, neurological, , musculoskeletal, and integument systems and related systems to the presenting problem are either stated in the history of present illness or were not pertinent or were negative for the symptoms and/or complaints related to the presenting medical problem. Positives and pertinent negatives as per HPI. All others reviewed and are negative.     Physical Exam   VS:   Vitals:    22 1051 BP: 132/74   Pulse: (!) 104   Resp: 20   Temp: 97.3 °F (36.3 °C)   TempSrc: Temporal   SpO2: 99%   Weight: 189 lb (85.7 kg)   Height: 5' 4\" (1.626 m)     Oxygen Saturation Interpretation: Normal.    Constitutional:  A&Ox3, development consistent with age, NAD. Lungs:  CTAB without wheezing, rales, or rhonchi. Heart:  RRR without pathologic murmurs, rubs, or gallops. Abdomen: Soft, nondistended, with mild suprapubic tenderness. No rebound, rigidity, or guarding. BS+ X4. No organomegaly. Back: No CVA tenderness. Skin:  Normal turgor. Warm, dry, without visible rash, unless noted elsewhere. Neurological:  Alert and oriented. Motor functions intact. Responds to verbal commands. Lab / Imaging Results   All laboratory and radiology results have been personally reviewed by myself. Labs:  Results for orders placed or performed in visit on 12/21/22   POCT Urinalysis no Micro   Result Value Ref Range    Color, UA yellow     Clarity, UA clear     Glucose, UA POC neg     Bilirubin, UA neg     Ketones, UA neg     Spec Grav, UA 1.020     Blood, UA POC neg     pH, UA 7.0     Protein, UA POC neg     Urobilinogen, UA 0.2     Leukocytes, UA small     Nitrite, UA neg        Imaging: All Radiology results interpreted by Radiologist unless otherwise noted. No orders to display       Medical Decision Making:    Patient is well appearing, non toxic and appropriate for outpatient management. Plan is for symptom management and PCP follow up. Assessment / Mateusz Mcmullen was seen today for dysuria. Diagnoses and all orders for this visit:    Acute cystitis without hematuria    Dysuria  -     POCT Urinalysis no Micro  -     Culture, Urine; Future    Other orders  -     cephALEXin (KEFLEX) 500 MG capsule; Take 1 capsule by mouth 2 times daily for 7 days  -     phenazopyridine (PYRIDIUM) 100 MG tablet; Take 1 tablet by mouth 3 times daily as needed for Pain      UA appears positive for a UTI.  Urine C&S pending, will call with results once available. Script written for cephalexin and Pyridium, side effects discussed. I did discuss with the patient at her next office visit we will discuss prophylactic antibiotics postcoitally. Increase fluids and rest. F/u PCP in 3-5 days if symptoms persist. ED sooner if symptoms worsen or change. ED immediately with the development of fever, shaking chills, body aches, flank pain, vomiting, CP, or SOB. Pt is in agreement with this care plan. All questions answered.      TORI Marino - NP

## 2022-12-24 LAB
ORGANISM: ABNORMAL
URINE CULTURE, ROUTINE: ABNORMAL

## 2023-01-12 DIAGNOSIS — G47.00 INSOMNIA, UNSPECIFIED TYPE: ICD-10-CM

## 2023-01-12 DIAGNOSIS — F51.01 PRIMARY INSOMNIA: ICD-10-CM

## 2023-01-13 DIAGNOSIS — F51.01 PRIMARY INSOMNIA: ICD-10-CM

## 2023-01-13 DIAGNOSIS — G47.00 INSOMNIA, UNSPECIFIED TYPE: ICD-10-CM

## 2023-01-13 RX ORDER — CARISOPRODOL 350 MG/1
350 TABLET ORAL DAILY
Qty: 30 TABLET | Refills: 0 | Status: SHIPPED | OUTPATIENT
Start: 2023-01-13 | End: 2023-02-12

## 2023-01-13 RX ORDER — CARISOPRODOL 350 MG/1
TABLET ORAL
Qty: 30 TABLET | OUTPATIENT
Start: 2023-01-13

## 2023-01-13 NOTE — TELEPHONE ENCOUNTER
----- Message from Melinda Montoya sent at 1/13/2023 11:02 AM EST -----  Subject: Refill Request    QUESTIONS  Name of Medication? carisoprodol (SOMA) 350 MG tablet  Patient-reported dosage and instructions? TAKE 1 TABLET BY MOUTH EVERY DAY   AT BEDTIME  How many days do you have left? 0  Preferred Pharmacy? [id number]  Pharmacy phone number (if available)? [phone]  ---------------------------------------------------------------------------  --------------  CALL BACK INFO  What is the best way for the office to contact you? OK to leave message on   voicemail  Preferred Call Back Phone Number? [phone]  ---------------------------------------------------------------------------  --------------  SCRIPT ANSWERS  Relationship to Patient?  Self

## 2023-01-22 DIAGNOSIS — E03.9 HYPOTHYROIDISM, UNSPECIFIED TYPE: ICD-10-CM

## 2023-01-23 RX ORDER — LEVOTHYROXINE SODIUM 0.03 MG/1
25 TABLET ORAL DAILY
Qty: 90 TABLET | Refills: 1 | Status: SHIPPED | OUTPATIENT
Start: 2023-01-23 | End: 2023-02-22

## 2023-02-03 ENCOUNTER — OFFICE VISIT (OUTPATIENT)
Dept: PRIMARY CARE CLINIC | Age: 38
End: 2023-02-03

## 2023-02-03 VITALS
DIASTOLIC BLOOD PRESSURE: 78 MMHG | HEART RATE: 89 BPM | SYSTOLIC BLOOD PRESSURE: 126 MMHG | BODY MASS INDEX: 33.12 KG/M2 | HEIGHT: 64 IN | TEMPERATURE: 97.6 F | RESPIRATION RATE: 16 BRPM | WEIGHT: 194 LBS | OXYGEN SATURATION: 99 %

## 2023-02-03 DIAGNOSIS — G62.9 NEUROPATHY: ICD-10-CM

## 2023-02-03 DIAGNOSIS — G89.29 CHRONIC BILATERAL LOW BACK PAIN WITH BILATERAL SCIATICA: ICD-10-CM

## 2023-02-03 DIAGNOSIS — F31.9 BIPOLAR 1 DISORDER (HCC): ICD-10-CM

## 2023-02-03 DIAGNOSIS — M77.8 LEFT WRIST TENDINITIS: ICD-10-CM

## 2023-02-03 DIAGNOSIS — E03.9 HYPOTHYROIDISM, UNSPECIFIED TYPE: ICD-10-CM

## 2023-02-03 DIAGNOSIS — K21.9 GASTROESOPHAGEAL REFLUX DISEASE WITHOUT ESOPHAGITIS: ICD-10-CM

## 2023-02-03 DIAGNOSIS — F51.01 PRIMARY INSOMNIA: Primary | ICD-10-CM

## 2023-02-03 DIAGNOSIS — R56.9 SEIZURES (HCC): ICD-10-CM

## 2023-02-03 DIAGNOSIS — M54.42 CHRONIC BILATERAL LOW BACK PAIN WITH BILATERAL SCIATICA: ICD-10-CM

## 2023-02-03 DIAGNOSIS — F41.9 ANXIETY: ICD-10-CM

## 2023-02-03 DIAGNOSIS — M79.7 FIBROMYALGIA: ICD-10-CM

## 2023-02-03 DIAGNOSIS — M54.41 CHRONIC BILATERAL LOW BACK PAIN WITH BILATERAL SCIATICA: ICD-10-CM

## 2023-02-03 RX ORDER — METHYLPREDNISOLONE 4 MG/1
TABLET ORAL
Qty: 1 KIT | Refills: 0 | Status: SHIPPED | OUTPATIENT
Start: 2023-02-03

## 2023-02-03 RX ORDER — KETOROLAC TROMETHAMINE 30 MG/ML
30 INJECTION, SOLUTION INTRAMUSCULAR; INTRAVENOUS ONCE
Status: COMPLETED | OUTPATIENT
Start: 2023-02-03 | End: 2023-02-03

## 2023-02-03 RX ORDER — IBUPROFEN 800 MG/1
800 TABLET ORAL
Qty: 90 TABLET | Refills: 2 | Status: SHIPPED | OUTPATIENT
Start: 2023-02-03

## 2023-02-03 RX ORDER — TRIAMCINOLONE ACETONIDE 40 MG/ML
40 INJECTION, SUSPENSION INTRA-ARTICULAR; INTRAMUSCULAR ONCE
Status: COMPLETED | OUTPATIENT
Start: 2023-02-03 | End: 2023-02-03

## 2023-02-03 RX ORDER — NITROFURANTOIN MACROCRYSTALS 50 MG/1
50 CAPSULE ORAL DAILY PRN
Qty: 30 CAPSULE | Refills: 2 | Status: SHIPPED | OUTPATIENT
Start: 2023-02-03 | End: 2023-02-13

## 2023-02-03 RX ADMIN — KETOROLAC TROMETHAMINE 30 MG: 30 INJECTION, SOLUTION INTRAMUSCULAR; INTRAVENOUS at 16:29

## 2023-02-03 RX ADMIN — TRIAMCINOLONE ACETONIDE 40 MG: 40 INJECTION, SUSPENSION INTRA-ARTICULAR; INTRAMUSCULAR at 16:30

## 2023-02-03 SDOH — ECONOMIC STABILITY: INCOME INSECURITY: HOW HARD IS IT FOR YOU TO PAY FOR THE VERY BASICS LIKE FOOD, HOUSING, MEDICAL CARE, AND HEATING?: NOT HARD AT ALL

## 2023-02-03 SDOH — ECONOMIC STABILITY: HOUSING INSECURITY
IN THE LAST 12 MONTHS, WAS THERE A TIME WHEN YOU DID NOT HAVE A STEADY PLACE TO SLEEP OR SLEPT IN A SHELTER (INCLUDING NOW)?: NO

## 2023-02-03 SDOH — ECONOMIC STABILITY: FOOD INSECURITY: WITHIN THE PAST 12 MONTHS, YOU WORRIED THAT YOUR FOOD WOULD RUN OUT BEFORE YOU GOT MONEY TO BUY MORE.: NEVER TRUE

## 2023-02-03 SDOH — ECONOMIC STABILITY: FOOD INSECURITY: WITHIN THE PAST 12 MONTHS, THE FOOD YOU BOUGHT JUST DIDN'T LAST AND YOU DIDN'T HAVE MONEY TO GET MORE.: NEVER TRUE

## 2023-02-03 ASSESSMENT — PATIENT HEALTH QUESTIONNAIRE - PHQ9
SUM OF ALL RESPONSES TO PHQ QUESTIONS 1-9: 4
9. THOUGHTS THAT YOU WOULD BE BETTER OFF DEAD, OR OF HURTING YOURSELF: 0
SUM OF ALL RESPONSES TO PHQ9 QUESTIONS 1 & 2: 0
4. FEELING TIRED OR HAVING LITTLE ENERGY: 1
SUM OF ALL RESPONSES TO PHQ QUESTIONS 1-9: 4
10. IF YOU CHECKED OFF ANY PROBLEMS, HOW DIFFICULT HAVE THESE PROBLEMS MADE IT FOR YOU TO DO YOUR WORK, TAKE CARE OF THINGS AT HOME, OR GET ALONG WITH OTHER PEOPLE: 0
SUM OF ALL RESPONSES TO PHQ QUESTIONS 1-9: 4
7. TROUBLE CONCENTRATING ON THINGS, SUCH AS READING THE NEWSPAPER OR WATCHING TELEVISION: 0
6. FEELING BAD ABOUT YOURSELF - OR THAT YOU ARE A FAILURE OR HAVE LET YOURSELF OR YOUR FAMILY DOWN: 0
2. FEELING DOWN, DEPRESSED OR HOPELESS: 0
SUM OF ALL RESPONSES TO PHQ QUESTIONS 1-9: 4
1. LITTLE INTEREST OR PLEASURE IN DOING THINGS: 0
5. POOR APPETITE OR OVEREATING: 0
8. MOVING OR SPEAKING SO SLOWLY THAT OTHER PEOPLE COULD HAVE NOTICED. OR THE OPPOSITE, BEING SO FIGETY OR RESTLESS THAT YOU HAVE BEEN MOVING AROUND A LOT MORE THAN USUAL: 0
3. TROUBLE FALLING OR STAYING ASLEEP: 3

## 2023-02-03 ASSESSMENT — ENCOUNTER SYMPTOMS
VOMITING: 0
NAUSEA: 0
EYES NEGATIVE: 1
COUGH: 0
BACK PAIN: 1
COLOR CHANGE: 0
RHINORRHEA: 0
DIARRHEA: 0
FACIAL SWELLING: 0
CONSTIPATION: 0
VOICE CHANGE: 0
ABDOMINAL PAIN: 0
SHORTNESS OF BREATH: 0
CHEST TIGHTNESS: 0
ABDOMINAL DISTENTION: 0
APNEA: 0
WHEEZING: 0

## 2023-02-03 NOTE — PROGRESS NOTES
ESTABLISHED PRIMARY CARE VISIT    2/3/23  Name: Albin Cornejo   : 1985   Age: 43 y.o. Sex: female    Subjective:     Chief Complaint   Patient presents with    Anxiety    Chronic Pain    Finger Pain     Left thumb       Albin Cornejo presents to the office for follow-up on their chronic problems. Today in the office issues were addressed including:  She has a pertinent past medical history for epilepsy, anxiety, chronic low back pain, seizures, recurrent UTi's and fibromyalgia. She does follow with neurology at [facility], Dr. Devorah Ortiz. MRI of the brain was unremarkable. SUZAN positive. EEG revealed frequent spikes. She is currently taking Keppra and topamax. No recent seizures. Anxiety/depression- Controlled. Follows with Vikki White NP. She is taking her medications as prescribed. Insomnia is controlled with Soma. Fibromyalgia was diagnosed in . She is currently taking Lyrica with relief of symptoms. She is always in pain. Back pain has significantly worsened. She does report bilateral radiculopathy symptoms. Denies any bowel or urinary incontinence, weakness or paresthesias. She did have a lumbar spine fusion in 2013. She established with Dr. Zaida Sinclair, unfortunately she had missed numerous appointments and they dismissed her from the practice. She did establish with Dr. Rajinder Thakur and underwent one epidural injection, she did not tolerate and would like to defer at this time. She reports she recently started working and symptoms have been significantly exacerbated and it is difficult to perform her duties for long periods of time. The patient is not up-to-date with health maintenance screenings. Previous lab work was reviewed. Review of Systems   Constitutional:  Negative for activity change, appetite change, fatigue, fever and unexpected weight change.    HENT:  Negative for congestion, facial swelling, mouth sores, postnasal drip, rhinorrhea, sneezing, tinnitus and voice change. Eyes: Negative. Respiratory:  Negative for apnea, cough, chest tightness, shortness of breath and wheezing. Cardiovascular:  Negative for chest pain, palpitations and leg swelling. Gastrointestinal:  Negative for abdominal distention, abdominal pain, constipation, diarrhea, nausea and vomiting. Endocrine: Negative for cold intolerance and heat intolerance. Genitourinary:  Positive for dysuria (Postcoitally). Negative for difficulty urinating, flank pain, frequency, pelvic pain and urgency. Musculoskeletal:  Positive for back pain, gait problem and myalgias. Negative for joint swelling and neck pain. Skin:  Negative for color change, pallor, rash and wound. Allergic/Immunologic: Negative for environmental allergies and food allergies. Neurological:  Negative for dizziness, tremors, seizures, syncope, facial asymmetry, speech difficulty, weakness, light-headedness, numbness and headaches. Psychiatric/Behavioral:  Negative for agitation, behavioral problems, confusion, decreased concentration, dysphoric mood, sleep disturbance and suicidal ideas. The patient is not hyperactive. Medications:     Current Outpatient Medications:     nitrofurantoin (MACRODANTIN) 50 MG capsule, Take 1 capsule by mouth daily as needed (postcoitally), Disp: 30 capsule, Rfl: 2    ibuprofen (ADVIL;MOTRIN) 800 MG tablet, Take 1 tablet by mouth 3 times daily (with meals), Disp: 90 tablet, Rfl: 2    methylPREDNISolone (MEDROL DOSEPACK) 4 MG tablet, Take by mouth., Disp: 1 kit, Rfl: 0    nicotine polacrilex (NICORETTE) 2 MG gum, Take 1 each by mouth as needed for Smoking cessation Maximum dose: 24 pieces/24 hours. , Disp: 110 each, Rfl: 3    levothyroxine (SYNTHROID) 25 MCG tablet, TAKE 1 TABLET BY MOUTH DAILY, Disp: 90 tablet, Rfl: 1    carisoprodol (SOMA) 350 MG tablet, Take 1 tablet by mouth daily for 30 days. , Disp: 30 tablet, Rfl: 0    pregabalin (LYRICA) 200 MG capsule, TAKE 1 CAPSULE BY MOUTH TWICE DAILY, Disp: , Rfl:     TRINTELLIX 10 MG TABS tablet, TAKE 1 TABLET BY MOUTH EVERY DAY, Disp: , Rfl:     topiramate (TOPAMAX) 50 MG tablet, Take 100 mg by mouth 2 times daily, Disp: , Rfl:     clonazePAM (KLONOPIN) 0.5 MG tablet, TAKE 1 TABLET BY MOUTH THREE TIMES DAILY, Disp: , Rfl:     omeprazole (PRILOSEC) 20 MG delayed release capsule, TAKE 1 CAPSULE BY MOUTH EVERY MORNING BEFORE BREAKFAST, Disp: 90 capsule, Rfl: 1    diclofenac (VOLTAREN) 75 MG EC tablet, TAKE 1 TABLET BY MOUTH TWICE DAILY WITH FOOD AS NEEDED FOR PAIN, Disp: , Rfl:     FLUoxetine (PROZAC) 40 MG capsule, 60 mg , Disp: , Rfl:     levETIRAcetam (KEPPRA) 500 MG tablet, TAKE 1 TABLET BY MOUTH EVERY MORNING AND 2 TABLETS EVERY EVENING, Disp: , Rfl:     ARIPiprazole (ABILIFY) 2 MG tablet, Take 1 tablet by mouth daily, Disp: 30 tablet, Rfl: 0    pregabalin (LYRICA) 150 MG capsule, Take 1 capsule by mouth 2 times daily for 30 days. , Disp: 60 capsule, Rfl: 2    Physical Exam:     Vitals:    02/03/23 1543   BP: 126/78   Pulse: 89   Resp: 16   Temp: 97.6 °F (36.4 °C)   SpO2: 99%   Weight: 194 lb (88 kg)   Height: 5' 4\" (1.626 m)     BP Readings from Last 3 Encounters:   02/03/23 126/78   12/21/22 132/74   10/24/22 120/80     Wt Readings from Last 3 Encounters:   02/03/23 194 lb (88 kg)   12/21/22 189 lb (85.7 kg)   10/24/22 189 lb (85.7 kg)     Physical Exam  Vitals and nursing note reviewed. Constitutional:       Appearance: Normal appearance. She is normal weight. HENT:      Head: Normocephalic and atraumatic. Right Ear: Tympanic membrane, ear canal and external ear normal.      Left Ear: Tympanic membrane, ear canal and external ear normal.      Nose: Nose normal.      Mouth/Throat:      Mouth: Mucous membranes are moist.      Pharynx: Oropharynx is clear. Eyes:      Extraocular Movements: Extraocular movements intact. Conjunctiva/sclera: Conjunctivae normal.      Pupils: Pupils are equal, round, and reactive to light. Cardiovascular:      Rate and Rhythm: Normal rate and regular rhythm. Pulses: Normal pulses. Heart sounds: Normal heart sounds. Pulmonary:      Effort: Pulmonary effort is normal.      Breath sounds: Normal breath sounds. No wheezing, rhonchi or rales. Abdominal:      General: Bowel sounds are normal. There is no distension. Palpations: Abdomen is soft. Tenderness: There is no abdominal tenderness. There is no rebound. Musculoskeletal:      Cervical back: Normal range of motion and neck supple. Thoracic back: Spasms and tenderness present. No bony tenderness. Decreased range of motion. Skin:     General: Skin is warm and dry. Capillary Refill: Capillary refill takes less than 2 seconds. Neurological:      General: No focal deficit present. Mental Status: She is alert and oriented to person, place, and time. Psychiatric:         Mood and Affect: Mood normal.         Behavior: Behavior normal.         Thought Content: Thought content normal.         Judgment: Judgment normal.     Testing:     Orders Placed This Encounter   Procedures    Comprehensive Metabolic Panel     Standing Status:   Future     Standing Expiration Date:   3/17/2023    CBC     Standing Status:   Future     Standing Expiration Date:   3/17/2023    Lipid Panel     Standing Status:   Future     Standing Expiration Date:   3/17/2023    TSH     Standing Status:   Future     Standing Expiration Date:   3/17/2023    Vitamin D 25 Hydroxy     Standing Status:   Future     Standing Expiration Date:   3/17/2023      No results found for this or any previous visit (from the past 24 hour(s)).   Assessment & Plan:     Problem List Items Addressed This Visit       Gastroesophageal reflux disease without esophagitis    Chronic bilateral low back pain with bilateral sciatica    Relevant Medications    ibuprofen (ADVIL;MOTRIN) 800 MG tablet    methylPREDNISolone (MEDROL DOSEPACK) 4 MG tablet    nicotine polacrilex (NICORETTE) 2 MG gum    Anxiety    Fibromyalgia    Relevant Medications    ibuprofen (ADVIL;MOTRIN) 800 MG tablet    methylPREDNISolone (MEDROL DOSEPACK) 4 MG tablet    Other Relevant Orders    Comprehensive Metabolic Panel    CBC    Lipid Panel    TSH    Seizures (HCC)    Relevant Orders    Comprehensive Metabolic Panel    CBC    Lipid Panel    TSH    Neuropathy    Insomnia - Primary    Relevant Orders    Comprehensive Metabolic Panel    CBC    Lipid Panel    TSH    Bipolar 1 disorder (HCC)    Relevant Orders    Comprehensive Metabolic Panel    CBC    Lipid Panel    TSH    Hypothyroidism     Other Visit Diagnoses       BMI 33.0-33.9,adult        Relevant Orders    Vitamin D 25 Hydroxy    Left wrist tendinitis              Will obtain fasting lab work at a later date, will call with results. Continue all medication as prescribed, OARRS report reviewed and appropriate. Patient would like to quit vaping, I did call in nicotine gum for her. We did discuss administration instructions. Toradol and Kenalog injection given today in office for poorly controlled thoracic back pain, will start the patient on ibuprofen as needed and a Medrol Dosepak, side effects and administration instructions were discussed. Advised to take medication with food. Keep follow-up appointments with all specialist.  She did undergo epidural injection, she would like to defer this at this time. She will get information to complete intermittent FMLA/work restrictions due to fibromyalgia and chronic back pain. We will start the patient on Macrobid as needed postcoitally to prevent recurrent UTIs. We did discuss good NILAM hygiene. Follow a mediterranean diet to reduce risk of stroke/heart attack. This includes emphasizing intake of vegetables, fruits, legumes, nuts, whole grains, and fish. Replace saturated fats with monounsaturated and polyunsaturated fats. Avoid trans fat. Limit sodium and cholesterol in diet.   Minimize processed foods, refined carbs, and sweetened drinks. Try to get 150 minutes per week of moderate intensity (brisk walking, biking 5 mph, recreational swimming and yoga) OR 75 minutes per week of vigorous intensity (jogging/running, swimming laps, biking >10mph) of aerobic activity. However, any amount of exercise is beneficial.     Counseled patient regarding above diagnosis, including possible risks and complications, especially if left uncontrolled. Counseled patient as appropriate and relevant regarding any possible side effects, risks, and alternatives to treatment; the patient and/or guardian verbalizes understanding, and is in agreement with the plan as detailed above. All educational materials and instructions were discussed and included on the After Visit Summary. All questions answered to the patient's satisfaction. The patient was advised to call or send [employer] message for any concerns prior to next appointment. Return in about 3 months (around 5/3/2023) for 3 month f/u.   TORI Viveros NP

## 2023-02-07 DIAGNOSIS — F51.01 PRIMARY INSOMNIA: ICD-10-CM

## 2023-02-07 DIAGNOSIS — G47.00 INSOMNIA, UNSPECIFIED TYPE: ICD-10-CM

## 2023-02-08 NOTE — TELEPHONE ENCOUNTER
Last Appointment:  2/3/2023  Future Appointments   Date Time Provider Ziyad Webb   5/23/2023  3:30 PM Craig Hays

## 2023-02-09 RX ORDER — CARISOPRODOL 350 MG/1
TABLET ORAL
Qty: 30 TABLET | Refills: 2 | Status: SHIPPED | OUTPATIENT
Start: 2023-02-09 | End: 2023-05-10

## 2023-02-24 ENCOUNTER — OFFICE VISIT (OUTPATIENT)
Dept: FAMILY MEDICINE CLINIC | Age: 38
End: 2023-02-24

## 2023-02-24 ENCOUNTER — TELEPHONE (OUTPATIENT)
Dept: PRIMARY CARE CLINIC | Age: 38
End: 2023-02-24

## 2023-02-24 VITALS
BODY MASS INDEX: 33.12 KG/M2 | TEMPERATURE: 97.9 F | HEART RATE: 94 BPM | OXYGEN SATURATION: 97 % | SYSTOLIC BLOOD PRESSURE: 124 MMHG | RESPIRATION RATE: 18 BRPM | WEIGHT: 194 LBS | DIASTOLIC BLOOD PRESSURE: 80 MMHG | HEIGHT: 64 IN

## 2023-02-24 DIAGNOSIS — R30.0 DYSURIA: ICD-10-CM

## 2023-02-24 DIAGNOSIS — N39.0 URINARY TRACT INFECTION WITHOUT HEMATURIA, SITE UNSPECIFIED: ICD-10-CM

## 2023-02-24 DIAGNOSIS — N39.0 URINARY TRACT INFECTION WITHOUT HEMATURIA, SITE UNSPECIFIED: Primary | ICD-10-CM

## 2023-02-24 LAB
BILIRUBIN, POC: ABNORMAL
BLOOD URINE, POC: ABNORMAL
CLARITY, POC: ABNORMAL
COLOR, POC: YELLOW
CONTROL: NORMAL
GLUCOSE URINE, POC: ABNORMAL
KETONES, POC: ABNORMAL
LEUKOCYTE EST, POC: ABNORMAL
NITRITE, POC: POSITIVE
PH, POC: 5
PREGNANCY TEST URINE, POC: NORMAL
PROTEIN, POC: ABNORMAL
SPECIFIC GRAVITY, POC: 1.03
UROBILINOGEN, POC: 0.2

## 2023-02-24 RX ORDER — SULFAMETHOXAZOLE AND TRIMETHOPRIM 800; 160 MG/1; MG/1
1 TABLET ORAL 2 TIMES DAILY
Qty: 6 TABLET | Refills: 0 | Status: SHIPPED | OUTPATIENT
Start: 2023-02-24 | End: 2023-02-27

## 2023-02-24 RX ORDER — PHENAZOPYRIDINE HYDROCHLORIDE 100 MG/1
100 TABLET, FILM COATED ORAL 3 TIMES DAILY PRN
Qty: 6 TABLET | Refills: 0 | Status: SHIPPED | OUTPATIENT
Start: 2023-02-24 | End: 2023-02-26

## 2023-02-24 RX ORDER — PREGABALIN 50 MG/1
CAPSULE ORAL
COMMUNITY
Start: 2023-02-10

## 2023-02-24 NOTE — TELEPHONE ENCOUNTER
Patient calling in requesting a purple cranberry pill. She said she doesn't know the name of it, but she gets its every time she has a uti.  She needs it called into her pharmacy

## 2023-02-24 NOTE — PROGRESS NOTES
23  Donovan Cesar : 1985 Sex: female  Age 43 y.o. Subjective:  Chief Complaint   Patient presents with    Urinary Tract Infection       HPI:   Donovan Cesar , 43 y.o. female presents to the clinic for evaluation of dysuria x 7 days. Reports associated frequency, urgency, and suprapubic pressure. The patient has taken Pyrdium for symptoms. The patient reports worsening symptoms over time. Denies hematuria, flank pain, vaginal discharge, vaginal bleeding, possibility of pregnancy, or lethargy. The patient also denies headache, fever, chest pain, abdominal pain, shortness of breath, and nausea / vomiting / diarrhea. Patient's last menstrual period was 2023. ROS:   Unless otherwise stated in this report the patient's positive and negative responses for review of systems for constitutional, eyes, ENT, cardiovascular, respiratory, gastrointestinal, neurological, , musculoskeletal, and integument systems and related systems to the presenting problem are either stated in the history of present illness or were not pertinent or were negative for the symptoms and/or complaints related to the presenting medical problem. Positives and pertinent negatives as per HPI. All others reviewed and are negative. PMH:     Past Medical History:   Diagnosis Date    Epilepsy ([facility] [address]     Thyroid disease        Past Surgical History:   Procedure Laterality Date    BACK SURGERY      FOOT SURGERY Left 2014    i and d with biopsy and removal of foreign body       History reviewed. No pertinent family history.     Medications:     Current Outpatient Medications:     pregabalin (LYRICA) 50 MG capsule, TAKE 1 CAPSULE BY MOUTH DAILY AT BEDTIME, Disp: , Rfl:     sulfamethoxazole-trimethoprim (BACTRIM DS) 800-160 MG per tablet, Take 1 tablet by mouth 2 times daily for 3 days, Disp: 6 tablet, Rfl: 0    phenazopyridine (PYRIDIUM) 100 MG tablet, Take 1 tablet by mouth 3 times daily as needed for Pain, Disp: 6 tablet, Rfl: 0    carisoprodol (SOMA) 350 MG tablet, TAKE 1 TABLET BY MOUTH DAILY, Disp: 30 tablet, Rfl: 2    ibuprofen (ADVIL;MOTRIN) 800 MG tablet, Take 1 tablet by mouth 3 times daily (with meals), Disp: 90 tablet, Rfl: 2    methylPREDNISolone (MEDROL DOSEPACK) 4 MG tablet, Take by mouth., Disp: 1 kit, Rfl: 0    nicotine polacrilex (NICORETTE) 2 MG gum, Take 1 each by mouth as needed for Smoking cessation Maximum dose: 24 pieces/24 hours. , Disp: 110 each, Rfl: 3    pregabalin (LYRICA) 200 MG capsule, TAKE 1 CAPSULE BY MOUTH TWICE DAILY, Disp: , Rfl:     TRINTELLIX 10 MG TABS tablet, TAKE 1 TABLET BY MOUTH EVERY DAY, Disp: , Rfl:     topiramate (TOPAMAX) 50 MG tablet, Take 100 mg by mouth 2 times daily, Disp: , Rfl:     clonazePAM (KLONOPIN) 0.5 MG tablet, TAKE 1 TABLET BY MOUTH THREE TIMES DAILY, Disp: , Rfl:     omeprazole (PRILOSEC) 20 MG delayed release capsule, TAKE 1 CAPSULE BY MOUTH EVERY MORNING BEFORE BREAKFAST, Disp: 90 capsule, Rfl: 1    diclofenac (VOLTAREN) 75 MG EC tablet, TAKE 1 TABLET BY MOUTH TWICE DAILY WITH FOOD AS NEEDED FOR PAIN, Disp: , Rfl:     FLUoxetine (PROZAC) 40 MG capsule, 60 mg , Disp: , Rfl:     levETIRAcetam (KEPPRA) 500 MG tablet, TAKE 1 TABLET BY MOUTH EVERY MORNING AND 2 TABLETS EVERY EVENING, Disp: , Rfl:     levothyroxine (SYNTHROID) 25 MCG tablet, TAKE 1 TABLET BY MOUTH DAILY, Disp: 90 tablet, Rfl: 1    ARIPiprazole (ABILIFY) 2 MG tablet, Take 1 tablet by mouth daily, Disp: 30 tablet, Rfl: 0    pregabalin (LYRICA) 150 MG capsule, Take 1 capsule by mouth 2 times daily for 30 days. , Disp: 60 capsule, Rfl: 2    Allergies:      Allergies   Allergen Reactions    Amoxicillin-Pot Clavulanate Hives    Vicodin [Hydrocodone-Acetaminophen]     Hydrocodone-Acetaminophen Nausea And Vomiting     Other reaction(s): Unknown       Social History:     Social History     Tobacco Use    Smoking status: Every Day     Packs/day: 0.50     Types: Cigarettes    Smokeless tobacco: Never   Vaping Use Vaping Use: Every day    Substances: Nicotine    Devices: Pre-filled or refillable cartridge, Refillable tank   Substance Use Topics    Alcohol use: Yes     Comment: occasionally       Physical Exam:     Vitals:    02/24/23 1429   BP: 124/80   Pulse: 94   Resp: 18   Temp: 97.9 °F (36.6 °C)   TempSrc: Temporal   SpO2: 97%   Weight: 194 lb (88 kg)   Height: 5' 4\" (1.626 m)       Physical Exam (PE)   Constitutional: Alert, development consistent with age. HENT:      Head: Normocephalic. Right Ear: External ear normal.      Left Ear: External ear normal.      Nose: Normal.      Mouth/Throat:     Mouth: Mucous membranes are moist.      Pharynx: Oropharynx is clear. Eyes: Pupils: Pupils are equal, round, and reactive to light. Neck: Normal ROM. Supple. Cardiovascular: Heart RRR without pathologic murmurs or gallops. Pulmonary: Respiratory effort normal.  Normal breath sounds. Abdomen: Soft, nondistended, with mild suprapubic tenderness. No rebound, rigidity, or guarding. BS+ X4. No organomegaly. Back: Negative CVA tenderness. Skin:  Normal turgor. Warm, dry, without visible rash, unless noted elsewhere. Musculoskeletal: General: Normal strength / ROM. Neurological:  Alert and oriented. Motor functions intact. Responds to verbal commands. Psychiatric: Mood and Affect: Mood normal. Behavior: Behavior normal    Testing:   (All laboratory and radiology results have been personally reviewed by myself)  Labs:  Results for orders placed or performed in visit on 02/24/23   POCT Urinalysis no Micro   Result Value Ref Range    Color, UA yellow     Clarity, UA cloudy     Glucose, UA POC neg     Bilirubin, UA neg     Ketones, UA neg     Spec Grav, UA 1.030     Blood, UA POC neg     pH, UA 5.0     Protein, UA POC 30mg     Urobilinogen, UA 0.2     Leukocytes, UA small     Nitrite, UA positive    POCT urine pregnancy   Result Value Ref Range    Preg Test, Ur neg     Control         Imaging:   All Radiology results interpreted by Radiologist unless otherwise noted. No orders to display       Assessment / Plan:   The patient's vitals, allergies, medications, and past medical history have been reviewed. Evita Watts was seen today for urinary tract infection. Diagnoses and all orders for this visit:    Urinary tract infection without hematuria, site unspecified  -     Culture, Urine; Future  -     sulfamethoxazole-trimethoprim (BACTRIM DS) 800-160 MG per tablet; Take 1 tablet by mouth 2 times daily for 3 days    Dysuria  -     POCT Urinalysis no Micro  -     POCT urine pregnancy  -     phenazopyridine (PYRIDIUM) 100 MG tablet; Take 1 tablet by mouth 3 times daily as needed for Pain      - Disposition: home    - Educational material printed for patient's review and were included in patient instructions. After Visit Summary was given to patient at the end of visit. - Patient is advised to rest and increase oral fluids. Discussed other symptomatic treatments with the patient today. The patient is to follow-up with PCP in the next 2-3 days for reevaluation. Red flag symptoms were also discussed with the patient today. If symptoms worsen the patient is to go directly to the emergency department for reevaluation and treatment. Pt verbalizes understanding and is in agreement with plan of care. All questions answered. SIGNATURE: Federico Herring, APRN-CNP    *NOTE: This report was transcribed using voice recognition software. Every effort was made to ensure accuracy; however, inadvertent computerized transcription errors may be present.

## 2023-02-26 LAB
ORGANISM: ABNORMAL
URINE CULTURE, ROUTINE: ABNORMAL

## 2023-03-14 ENCOUNTER — OFFICE VISIT (OUTPATIENT)
Dept: PRIMARY CARE CLINIC | Age: 38
End: 2023-03-14
Payer: OTHER GOVERNMENT

## 2023-03-14 VITALS
SYSTOLIC BLOOD PRESSURE: 126 MMHG | HEART RATE: 100 BPM | WEIGHT: 186 LBS | BODY MASS INDEX: 31.76 KG/M2 | RESPIRATION RATE: 16 BRPM | TEMPERATURE: 97.6 F | OXYGEN SATURATION: 97 % | DIASTOLIC BLOOD PRESSURE: 72 MMHG | HEIGHT: 64 IN

## 2023-03-14 DIAGNOSIS — R56.9 SEIZURES (HCC): ICD-10-CM

## 2023-03-14 DIAGNOSIS — K21.9 GASTROESOPHAGEAL REFLUX DISEASE WITHOUT ESOPHAGITIS: ICD-10-CM

## 2023-03-14 DIAGNOSIS — F31.9 BIPOLAR 1 DISORDER (HCC): ICD-10-CM

## 2023-03-14 DIAGNOSIS — R56.9 SEIZURES (HCC): Primary | ICD-10-CM

## 2023-03-14 DIAGNOSIS — E03.9 HYPOTHYROIDISM, UNSPECIFIED TYPE: ICD-10-CM

## 2023-03-14 DIAGNOSIS — Z30.41 ORAL CONTRACEPTIVE USE: ICD-10-CM

## 2023-03-14 DIAGNOSIS — F41.9 ANXIETY: ICD-10-CM

## 2023-03-14 DIAGNOSIS — G47.00 INSOMNIA, UNSPECIFIED TYPE: ICD-10-CM

## 2023-03-14 DIAGNOSIS — M79.7 FIBROMYALGIA: ICD-10-CM

## 2023-03-14 DIAGNOSIS — Z01.818 TUBAL LIGATION EVALUATION: ICD-10-CM

## 2023-03-14 LAB
CHOLESTEROL, TOTAL: 246 MG/DL (ref 0–199)
CONTROL: NORMAL
HDLC SERPL-MCNC: 48 MG/DL
LDL CHOLESTEROL CALCULATED: 172 MG/DL (ref 0–99)
PREGNANCY TEST URINE, POC: NEGATIVE
TRIGL SERPL-MCNC: 132 MG/DL (ref 0–149)
VITAMIN D 25-HYDROXY: 21 NG/ML (ref 30–100)
VLDLC SERPL CALC-MCNC: 26 MG/DL

## 2023-03-14 PROCEDURE — 90677 PCV20 VACCINE IM: CPT | Performed by: NURSE PRACTITIONER

## 2023-03-14 PROCEDURE — 99214 OFFICE O/P EST MOD 30 MIN: CPT | Performed by: NURSE PRACTITIONER

## 2023-03-14 PROCEDURE — 81025 URINE PREGNANCY TEST: CPT | Performed by: NURSE PRACTITIONER

## 2023-03-14 PROCEDURE — 90471 IMMUNIZATION ADMIN: CPT | Performed by: NURSE PRACTITIONER

## 2023-03-14 RX ORDER — ARIPIPRAZOLE 10 MG/1
TABLET ORAL
COMMUNITY
Start: 2023-03-13

## 2023-03-14 RX ORDER — ACETAMINOPHEN AND CODEINE PHOSPHATE 120; 12 MG/5ML; MG/5ML
1 SOLUTION ORAL DAILY
Qty: 84 TABLET | OUTPATIENT
Start: 2023-03-14

## 2023-03-14 RX ORDER — ACETAMINOPHEN AND CODEINE PHOSPHATE 120; 12 MG/5ML; MG/5ML
1 SOLUTION ORAL DAILY
Qty: 30 TABLET | Refills: 2 | Status: SHIPPED | OUTPATIENT
Start: 2023-03-14

## 2023-03-14 ASSESSMENT — ENCOUNTER SYMPTOMS
COUGH: 0
APNEA: 0
FACIAL SWELLING: 0
ABDOMINAL PAIN: 0
EYES NEGATIVE: 1
VOICE CHANGE: 0
CONSTIPATION: 0
COLOR CHANGE: 0
WHEEZING: 0
DIARRHEA: 0
NAUSEA: 0
CHEST TIGHTNESS: 0
RHINORRHEA: 0
SHORTNESS OF BREATH: 0
VOMITING: 0
ABDOMINAL DISTENTION: 0
BACK PAIN: 1

## 2023-03-14 NOTE — PROGRESS NOTES
ESTABLISHED PRIMARY CARE VISIT    2/3/23  Name: Hai Spencer   : 1985   Age: 43 y.o. Sex: female    Subjective:     Chief Complaint   Patient presents with    Insomnia    Anxiety    Contraception       Hai Spencer presents to the office for follow-up on their chronic problems. Today in the office issues were addressed including:  She has a pertinent past medical history for epilepsy, anxiety, chronic low back pain, seizures, recurrent UTi's and fibromyalgia. She does follow with neurology at [facility], Dr. Dolores Monteiro. MRI of the brain was unremarkable. SUZAN positive. EEG revealed frequent spikes. She is currently taking Keppra and topamax. No recent seizures. Anxiety/depression- Controlled. Follows with Lissy Mathews NP. She is taking her medications as prescribed. Insomnia is controlled with Soma. Fibromyalgia was diagnosed in . She is currently taking Lyrica with relief of symptoms. She is always in pain. Denies any bowel or urinary incontinence, weakness or paresthesias. She did have a lumbar spine fusion in 2013. She established with Dr. Barbara Coronel, unfortunately she had missed numerous appointments and they dismissed her from the practice. She did establish with Dr. Brennon Winter and underwent one epidural injection, she did not tolerate and would like to defer at this time. Menorrhagia-patient is requesting oral contraceptive. Given her age and her smoker status she is not eligible for estrogen-containing contraceptive. We did discuss IUD versus Nexplanon versus Depo-Provera versus progesterone only pills. Patient is requesting minipill and referral for possible tubal ligation. The patient is not up-to-date with health maintenance screenings. Previous lab work was reviewed. Review of Systems   Constitutional:  Negative for activity change, appetite change, fatigue, fever and unexpected weight change.    HENT:  Negative for congestion, facial swelling, mouth sores, postnasal drip, rhinorrhea, sneezing, tinnitus and voice change. Eyes: Negative. Respiratory:  Negative for apnea, cough, chest tightness, shortness of breath and wheezing. Cardiovascular:  Negative for chest pain, palpitations and leg swelling. Gastrointestinal:  Negative for abdominal distention, abdominal pain, constipation, diarrhea, nausea and vomiting. Endocrine: Negative for cold intolerance and heat intolerance. Genitourinary:  Positive for dysuria (Postcoitally). Negative for difficulty urinating, flank pain, frequency, pelvic pain and urgency. Musculoskeletal:  Positive for back pain, gait problem and myalgias. Negative for joint swelling and neck pain. Skin:  Negative for color change, pallor, rash and wound. Allergic/Immunologic: Negative for environmental allergies and food allergies. Neurological:  Negative for dizziness, tremors, seizures, syncope, facial asymmetry, speech difficulty, weakness, light-headedness, numbness and headaches. Psychiatric/Behavioral:  Negative for agitation, behavioral problems, confusion, decreased concentration, dysphoric mood, sleep disturbance and suicidal ideas. The patient is not hyperactive. Medications:     Current Outpatient Medications:     norethindrone (MICRONOR) 0.35 MG tablet, Take 1 tablet by mouth daily, Disp: 30 tablet, Rfl: 2    pregabalin (LYRICA) 50 MG capsule, TAKE 1 CAPSULE BY MOUTH DAILY AT BEDTIME, Disp: , Rfl:     carisoprodol (SOMA) 350 MG tablet, TAKE 1 TABLET BY MOUTH DAILY, Disp: 30 tablet, Rfl: 2    ibuprofen (ADVIL;MOTRIN) 800 MG tablet, Take 1 tablet by mouth 3 times daily (with meals), Disp: 90 tablet, Rfl: 2    nicotine polacrilex (NICORETTE) 2 MG gum, Take 1 each by mouth as needed for Smoking cessation Maximum dose: 24 pieces/24 hours. , Disp: 110 each, Rfl: 3    pregabalin (LYRICA) 200 MG capsule, TAKE 1 CAPSULE BY MOUTH TWICE DAILY, Disp: , Rfl:     TRINTELLIX 10 MG TABS tablet, TAKE 1 TABLET BY MOUTH EVERY DAY, Disp: , Rfl:     topiramate (TOPAMAX) 50 MG tablet, Take 100 mg by mouth 2 times daily, Disp: , Rfl:     clonazePAM (KLONOPIN) 0.5 MG tablet, TAKE 1 TABLET BY MOUTH THREE TIMES DAILY, Disp: , Rfl:     omeprazole (PRILOSEC) 20 MG delayed release capsule, TAKE 1 CAPSULE BY MOUTH EVERY MORNING BEFORE BREAKFAST, Disp: 90 capsule, Rfl: 1    diclofenac (VOLTAREN) 75 MG EC tablet, TAKE 1 TABLET BY MOUTH TWICE DAILY WITH FOOD AS NEEDED FOR PAIN, Disp: , Rfl:     levETIRAcetam (KEPPRA) 500 MG tablet, TAKE 1 TABLET BY MOUTH EVERY MORNING AND 2 TABLETS EVERY EVENING, Disp: , Rfl:     ARIPiprazole (ABILIFY) 10 MG tablet, , Disp: , Rfl:     levothyroxine (SYNTHROID) 25 MCG tablet, TAKE 1 TABLET BY MOUTH DAILY, Disp: 90 tablet, Rfl: 1    pregabalin (LYRICA) 150 MG capsule, Take 1 capsule by mouth 2 times daily for 30 days. , Disp: 60 capsule, Rfl: 2    Physical Exam:     Vitals:    03/14/23 0920   BP: 126/72   Pulse: 100   Resp: 16   Temp: 97.6 °F (36.4 °C)   SpO2: 97%   Weight: 186 lb (84.4 kg)   Height: 5' 4\" (1.626 m)     BP Readings from Last 3 Encounters:   03/14/23 126/72   02/24/23 124/80   02/03/23 126/78     Wt Readings from Last 3 Encounters:   03/14/23 186 lb (84.4 kg)   02/24/23 194 lb (88 kg)   02/03/23 194 lb (88 kg)     Physical Exam  Vitals and nursing note reviewed. Constitutional:       Appearance: Normal appearance. She is normal weight. HENT:      Head: Normocephalic and atraumatic. Right Ear: Tympanic membrane, ear canal and external ear normal.      Left Ear: Tympanic membrane, ear canal and external ear normal.      Nose: Nose normal.      Mouth/Throat:      Mouth: Mucous membranes are moist.      Pharynx: Oropharynx is clear. Eyes:      Extraocular Movements: Extraocular movements intact. Conjunctiva/sclera: Conjunctivae normal.      Pupils: Pupils are equal, round, and reactive to light. Cardiovascular:      Rate and Rhythm: Normal rate and regular rhythm.       Pulses: Normal pulses. Heart sounds: Normal heart sounds. Pulmonary:      Effort: Pulmonary effort is normal.      Breath sounds: Normal breath sounds. No wheezing, rhonchi or rales. Abdominal:      General: Bowel sounds are normal. There is no distension. Palpations: Abdomen is soft. Tenderness: There is no abdominal tenderness. There is no rebound. Musculoskeletal:      Cervical back: Normal range of motion and neck supple. Skin:     General: Skin is warm and dry. Capillary Refill: Capillary refill takes less than 2 seconds. Neurological:      General: No focal deficit present. Mental Status: She is alert and oriented to person, place, and time. Psychiatric:         Mood and Affect: Mood normal.         Behavior: Behavior normal.         Thought Content: Thought content normal.         Judgment: Judgment normal.     Testing:     Orders Placed This Encounter   Procedures    Pneumococcal, PCV20, PREVNAR 21, (age 25 yrs+), IM, PF     Receive 1 year after 21. Receive after 13. If received 13 and 23, not needed. Every 5 years    Lipid Panel     Standing Status:   Future     Number of Occurrences:   1     Standing Expiration Date:   4/25/2023    Vitamin D 25 Hydroxy     Standing Status:   Future     Number of Occurrences:   1     Standing Expiration Date:   4/25/2023    PAIN MANAGEMENT PROFILE 1 W/ CONFIRMATION, URINE     Standing Status:   Future     Number of Occurrences:   1     Standing Expiration Date:   3/14/2024    (Bianca Ricketts) - Rhianna Noland MD, [facility] ([facility])     Referral Priority:   Routine     Referral Type:   Eval and Treat     Referral Reason:   Specialty Services Required     Referred to Provider:   Bhavya Licona MD     Requested Specialty:   Obstetrics & Gynecology     Number of Visits Requested:   1    POCT urine pregnancy      No results found for this or any previous visit (from the past 24 hour(s)).   Assessment & Plan:     Problem List Items Addressed This Visit Gastroesophageal reflux disease without esophagitis    Anxiety    Fibromyalgia    Seizures (HCC) - Primary    Relevant Orders    Lipid Panel    POCT urine pregnancy    Insomnia    Relevant Orders    Lipid Panel    PAIN MANAGEMENT PROFILE 1 W/ CONFIRMATION, URINE    Bipolar 1 disorder ([address]    Hypothyroidism     Other Visit Diagnoses       Oral contraceptive use        Relevant Orders    POCT urine pregnancy    BMI 31.0-31.9,adult        Relevant Orders    Lipid Panel    Vitamin D 25 Hydroxy    Tubal ligation evaluation        Relevant Orders    (Epic) - Mecac Godoy MD, OB/GYN, [facility] ([facility])          Patient is agreeable to pneumococcal vaccine in office today. Will obtain fasting lab work today, will call with results. Previous lab work from care everywhere was reviewed with the patient today. Continue all medication as prescribed, OARRS report reviewed and appropriate. Patient would like to quit vaping, she has been trialing nicotine gum. We did discuss administration instructions. Given patient is over the age of 27 and vapes she is not a candidate for estrogen-containing oral contraceptive. POCT pregnancy test in office is negative. We will start the patient on norethindrone, side effects and administration instructions discussed. We did encourage backup method if patient is moving 2 hours late on a pill and she is to initiate medication today and use a backup method for at least 2 days. A referral was placed to gynecology for evaluation of tubal ligation versus ablation. Keep follow-up appointments with all specialist.  Cary Catherine as needed postcoitally to prevent recurrent UTIs. We did discuss good NILAM hygiene. Follow a mediterranean diet to reduce risk of stroke/heart attack. This includes emphasizing intake of vegetables, fruits, legumes, nuts, whole grains, and fish. Replace saturated fats with monounsaturated and polyunsaturated fats. Avoid trans fat.  Limit sodium and cholesterol in diet. Minimize processed foods, refined carbs, and sweetened drinks. Try to get 150 minutes per week of moderate intensity (brisk walking, biking 5 mph, recreational swimming and yoga) OR 75 minutes per week of vigorous intensity (jogging/running, swimming laps, biking >10mph) of aerobic activity. However, any amount of exercise is beneficial.     Counseled patient regarding above diagnosis, including possible risks and complications, especially if left uncontrolled. Counseled patient as appropriate and relevant regarding any possible side effects, risks, and alternatives to treatment; the patient and/or guardian verbalizes understanding, and is in agreement with the plan as detailed above. All educational materials and instructions were discussed and included on the After Visit Summary. All questions answered to the patient's satisfaction. The patient was advised to call or send [employer] message for any concerns prior to next appointment. Return for Keep previously scheduled follow-up appointment.   TORI Becker - JOANNA

## 2023-05-03 DIAGNOSIS — E03.9 HYPOTHYROIDISM, UNSPECIFIED TYPE: ICD-10-CM

## 2023-05-03 RX ORDER — IBUPROFEN 800 MG/1
TABLET ORAL
Qty: 90 TABLET | Refills: 2 | Status: SHIPPED | OUTPATIENT
Start: 2023-05-03

## 2023-05-03 RX ORDER — NITROFURANTOIN MACROCRYSTALS 50 MG/1
CAPSULE ORAL
Qty: 30 CAPSULE | Refills: 2 | Status: SHIPPED | OUTPATIENT
Start: 2023-05-03

## 2023-05-03 RX ORDER — LEVOTHYROXINE SODIUM 0.03 MG/1
25 TABLET ORAL DAILY
Qty: 90 TABLET | Refills: 1 | Status: SHIPPED | OUTPATIENT
Start: 2023-05-03 | End: 2023-06-02

## 2023-05-03 RX ORDER — OMEPRAZOLE 20 MG/1
CAPSULE, DELAYED RELEASE ORAL
Qty: 30 CAPSULE | Refills: 2 | Status: SHIPPED | OUTPATIENT
Start: 2023-05-03

## 2023-05-03 NOTE — TELEPHONE ENCOUNTER
Last Appointment:  3/14/2023  Future Appointments   Date Time Provider Ziyad Webb   5/23/2023  3:30 PM TORI Nickerson NP [facility]   6/7/2023  2:40 PM SCHEDULE, [facility]

## 2023-05-15 DIAGNOSIS — F51.01 PRIMARY INSOMNIA: ICD-10-CM

## 2023-05-15 DIAGNOSIS — G47.00 INSOMNIA, UNSPECIFIED TYPE: ICD-10-CM

## 2023-05-15 RX ORDER — CARISOPRODOL 350 MG/1
TABLET ORAL
Qty: 30 TABLET | Refills: 1 | Status: SHIPPED | OUTPATIENT
Start: 2023-05-15 | End: 2023-07-14

## 2023-05-15 NOTE — TELEPHONE ENCOUNTER
Last Appointment:  3/14/2023  Future Appointments   Date Time Provider Ziyad Webb   5/23/2023  3:30 PM TORI Grover - NP [facility]   6/7/2023  2:40 PM SCHEDULE, [facility]

## 2023-05-23 ENCOUNTER — OFFICE VISIT (OUTPATIENT)
Dept: PRIMARY CARE CLINIC | Age: 38
End: 2023-05-23
Payer: OTHER GOVERNMENT

## 2023-05-23 VITALS
SYSTOLIC BLOOD PRESSURE: 116 MMHG | BODY MASS INDEX: 30.9 KG/M2 | HEART RATE: 82 BPM | DIASTOLIC BLOOD PRESSURE: 64 MMHG | OXYGEN SATURATION: 98 % | RESPIRATION RATE: 16 BRPM | HEIGHT: 64 IN | WEIGHT: 181 LBS

## 2023-05-23 DIAGNOSIS — G47.00 INSOMNIA, UNSPECIFIED TYPE: ICD-10-CM

## 2023-05-23 DIAGNOSIS — E03.9 HYPOTHYROIDISM, UNSPECIFIED TYPE: ICD-10-CM

## 2023-05-23 DIAGNOSIS — M54.41 CHRONIC BILATERAL LOW BACK PAIN WITH BILATERAL SCIATICA: ICD-10-CM

## 2023-05-23 DIAGNOSIS — F51.01 PRIMARY INSOMNIA: ICD-10-CM

## 2023-05-23 DIAGNOSIS — K21.9 GASTROESOPHAGEAL REFLUX DISEASE WITHOUT ESOPHAGITIS: Primary | ICD-10-CM

## 2023-05-23 DIAGNOSIS — M54.42 CHRONIC BILATERAL LOW BACK PAIN WITH BILATERAL SCIATICA: ICD-10-CM

## 2023-05-23 DIAGNOSIS — R56.9 SEIZURES (HCC): ICD-10-CM

## 2023-05-23 DIAGNOSIS — G89.29 CHRONIC BILATERAL LOW BACK PAIN WITH BILATERAL SCIATICA: ICD-10-CM

## 2023-05-23 DIAGNOSIS — F31.9 BIPOLAR 1 DISORDER (HCC): ICD-10-CM

## 2023-05-23 DIAGNOSIS — G62.9 NEUROPATHY: ICD-10-CM

## 2023-05-23 DIAGNOSIS — F41.9 ANXIETY: ICD-10-CM

## 2023-05-23 DIAGNOSIS — M79.7 FIBROMYALGIA: ICD-10-CM

## 2023-05-23 PROCEDURE — 99214 OFFICE O/P EST MOD 30 MIN: CPT | Performed by: NURSE PRACTITIONER

## 2023-05-23 RX ORDER — ACETAMINOPHEN AND CODEINE PHOSPHATE 120; 12 MG/5ML; MG/5ML
1 SOLUTION ORAL DAILY
Qty: 90 TABLET | Refills: 1 | Status: SHIPPED | OUTPATIENT
Start: 2023-05-23

## 2023-05-23 RX ORDER — NITROFURANTOIN MACROCRYSTALS 50 MG/1
CAPSULE ORAL
Qty: 30 CAPSULE | Refills: 2 | Status: SHIPPED | OUTPATIENT
Start: 2023-05-23

## 2023-05-23 RX ORDER — IBUPROFEN 800 MG/1
TABLET ORAL
Qty: 90 TABLET | Refills: 2 | Status: SHIPPED | OUTPATIENT
Start: 2023-05-23

## 2023-05-23 RX ORDER — LEVOTHYROXINE SODIUM 0.03 MG/1
25 TABLET ORAL DAILY
Qty: 90 TABLET | Refills: 1 | Status: SHIPPED | OUTPATIENT
Start: 2023-05-23 | End: 2023-06-22

## 2023-05-23 RX ORDER — CARISOPRODOL 350 MG/1
350 TABLET ORAL DAILY
Qty: 30 TABLET | Refills: 2 | Status: SHIPPED | OUTPATIENT
Start: 2023-06-12 | End: 2023-08-11

## 2023-05-23 RX ORDER — OMEPRAZOLE 20 MG/1
20 CAPSULE, DELAYED RELEASE ORAL
Qty: 90 CAPSULE | Refills: 1 | Status: SHIPPED | OUTPATIENT
Start: 2023-05-23

## 2023-05-23 ASSESSMENT — ENCOUNTER SYMPTOMS
RHINORRHEA: 0
COUGH: 0
APNEA: 0
WHEEZING: 0
ABDOMINAL DISTENTION: 0
NAUSEA: 0
SHORTNESS OF BREATH: 0
FACIAL SWELLING: 0
VOICE CHANGE: 0
EYES NEGATIVE: 1
ABDOMINAL PAIN: 0
CHEST TIGHTNESS: 0
CONSTIPATION: 0
COLOR CHANGE: 0
DIARRHEA: 0
BACK PAIN: 1
VOMITING: 0

## 2023-05-23 NOTE — PROGRESS NOTES
intolerance. Genitourinary:  Negative for difficulty urinating, dysuria (Postcoitally), flank pain, frequency, pelvic pain and urgency. Musculoskeletal:  Positive for back pain and gait problem. Negative for joint swelling, myalgias and neck pain. Skin:  Negative for color change, pallor, rash and wound. Allergic/Immunologic: Negative for environmental allergies and food allergies. Neurological:  Negative for dizziness, tremors, seizures, syncope, facial asymmetry, speech difficulty, weakness, light-headedness, numbness and headaches. Psychiatric/Behavioral:  Negative for agitation, behavioral problems, confusion, decreased concentration, dysphoric mood, sleep disturbance and suicidal ideas. The patient is not hyperactive. Medications:     Current Outpatient Medications:     omeprazole (PRILOSEC) 20 MG delayed release capsule, Take 1 capsule by mouth every morning (before breakfast), Disp: 90 capsule, Rfl: 1    norethindrone (MICRONOR) 0.35 MG tablet, Take 1 tablet by mouth daily, Disp: 90 tablet, Rfl: 1    nitrofurantoin (MACRODANTIN) 50 MG capsule, TAKE 1 CAPSULE BY MOUTH DAILY AS NEEDED( POSTCOITALLY), Disp: 30 capsule, Rfl: 2    levothyroxine (SYNTHROID) 25 MCG tablet, Take 1 tablet by mouth Daily, Disp: 90 tablet, Rfl: 1    ibuprofen (ADVIL;MOTRIN) 800 MG tablet, TAKE 1 TABLET BY MOUTH THREE TIMES DAILY WITH MEALS, Disp: 90 tablet, Rfl: 2    [START ON 6/12/2023] carisoprodol (SOMA) 350 MG tablet, Take 1 tablet by mouth daily for 60 days. , Disp: 30 tablet, Rfl: 2    ARIPiprazole (ABILIFY) 10 MG tablet, , Disp: , Rfl:     pregabalin (LYRICA) 50 MG capsule, TAKE 1 CAPSULE BY MOUTH DAILY AT BEDTIME, Disp: , Rfl:     pregabalin (LYRICA) 200 MG capsule, TAKE 1 CAPSULE BY MOUTH TWICE DAILY, Disp: , Rfl:     TRINTELLIX 10 MG TABS tablet, TAKE 1 TABLET BY MOUTH EVERY DAY, Disp: , Rfl:     clonazePAM (KLONOPIN) 0.5 MG tablet, TAKE 1 TABLET BY MOUTH THREE TIMES DAILY, Disp: , Rfl:     diclofenac

## 2023-06-01 ENCOUNTER — OFFICE VISIT (OUTPATIENT)
Dept: FAMILY MEDICINE CLINIC | Age: 38
End: 2023-06-01
Payer: OTHER GOVERNMENT

## 2023-06-01 VITALS
DIASTOLIC BLOOD PRESSURE: 80 MMHG | TEMPERATURE: 97.9 F | SYSTOLIC BLOOD PRESSURE: 120 MMHG | WEIGHT: 181 LBS | BODY MASS INDEX: 30.9 KG/M2 | RESPIRATION RATE: 18 BRPM | OXYGEN SATURATION: 98 % | HEART RATE: 99 BPM | HEIGHT: 64 IN

## 2023-06-01 DIAGNOSIS — N30.01 ACUTE CYSTITIS WITH HEMATURIA: Primary | ICD-10-CM

## 2023-06-01 DIAGNOSIS — R30.0 DYSURIA: ICD-10-CM

## 2023-06-01 LAB
BILIRUBIN, POC: ABNORMAL
BLOOD URINE, POC: ABNORMAL
CLARITY, POC: CLEAR
COLOR, POC: YELLOW
CONTROL: NORMAL
GLUCOSE URINE, POC: ABNORMAL
KETONES, POC: ABNORMAL
LEUKOCYTE EST, POC: ABNORMAL
NITRITE, POC: POSITIVE
PH, POC: 7
PREGNANCY TEST URINE, POC: NORMAL
PROTEIN, POC: ABNORMAL
SPECIFIC GRAVITY, POC: 1.02
UROBILINOGEN, POC: 0.2

## 2023-06-01 PROCEDURE — 99213 OFFICE O/P EST LOW 20 MIN: CPT

## 2023-06-01 PROCEDURE — 81002 URINALYSIS NONAUTO W/O SCOPE: CPT

## 2023-06-01 PROCEDURE — 81025 URINE PREGNANCY TEST: CPT

## 2023-06-01 RX ORDER — NITROFURANTOIN 25; 75 MG/1; MG/1
100 CAPSULE ORAL 2 TIMES DAILY
Qty: 10 CAPSULE | Refills: 0 | Status: SHIPPED | OUTPATIENT
Start: 2023-06-01 | End: 2023-06-06

## 2023-06-01 NOTE — PROGRESS NOTES
Chief Complaint:   Dysuria      History of Present Illness   Source of history provided by:  patient. Carmen Luciano is a 43 y.o. old female who presents to [facility] care for dysuria, which occured a few day(s) prior to arrival. Symptoms are associated with bad odor. She has a history of no complicating symptoms. Denies gross hematuria. Denies associated flank pain. Denies any fever, chills, vaginal discharge, vaginal bleeding, possibility of pregnancy, vomiting, diarrhea, or lethargy. LMP: couple weeks ago    Review of Systems   Unless otherwise stated in this report or unable to obtain because of the patient's clinical or mental status as evidenced by the medical record, this patients's positive and negative responses for Review of Systems, constitutional, psych, eyes, ENT, cardiovascular, respiratory, gastrointestinal, neurological, genitourinary, musculoskeletal, integument systems and systems related to the presenting problem are either stated in the preceding or were not pertinent or were negative for the symptoms and/or complaints related to the medical problem. Past Medical History:  has a past medical history of Epilepsy ([facility] [address] and Thyroid disease. Past Surgical History:  has a past surgical history that includes back surgery and Foot surgery (Left, 7/2/2014). Social History:  reports that she has been smoking cigarettes. She has been smoking an average of .5 packs per day. She has never used smokeless tobacco. She reports current alcohol use. Family History: family history is not on file. Allergies: Amoxicillin-pot clavulanate, Vicodin [hydrocodone-acetaminophen], and Hydrocodone-acetaminophen    Physical Exam   Vital Signs:  /80   Pulse 99   Temp 97.9 °F (36.6 °C) (Temporal)   Resp 18   Ht 5' 4\" (1.626 m)   Wt 181 lb (82.1 kg)   SpO2 98%   BMI 31.07 kg/m²    Oxygen Saturation Interpretation: Normal.    Constitutional:  A&Ox3, development consistent with age, NAD.   Lungs:  CTAB

## 2023-06-04 LAB
BACTERIA UR CULT: ABNORMAL
ORGANISM: ABNORMAL

## 2023-06-05 RX ORDER — ACETAMINOPHEN AND CODEINE PHOSPHATE 120; 12 MG/5ML; MG/5ML
1 SOLUTION ORAL DAILY
Qty: 28 TABLET | OUTPATIENT
Start: 2023-06-05

## 2023-06-07 ENCOUNTER — HOSPITAL ENCOUNTER (OUTPATIENT)
Age: 38
Discharge: HOME | End: 2023-06-09

## 2023-07-05 ENCOUNTER — OFFICE VISIT (OUTPATIENT)
Dept: FAMILY MEDICINE CLINIC | Age: 38
End: 2023-07-05
Payer: OTHER GOVERNMENT

## 2023-07-05 VITALS
HEART RATE: 98 BPM | OXYGEN SATURATION: 95 % | DIASTOLIC BLOOD PRESSURE: 70 MMHG | BODY MASS INDEX: 31.24 KG/M2 | WEIGHT: 182 LBS | SYSTOLIC BLOOD PRESSURE: 101 MMHG | RESPIRATION RATE: 16 BRPM | TEMPERATURE: 97.7 F

## 2023-07-05 DIAGNOSIS — R31.9 URINARY TRACT INFECTION WITH HEMATURIA, SITE UNSPECIFIED: Primary | ICD-10-CM

## 2023-07-05 DIAGNOSIS — N39.0 URINARY TRACT INFECTION WITH HEMATURIA, SITE UNSPECIFIED: Primary | ICD-10-CM

## 2023-07-05 DIAGNOSIS — R30.0 DYSURIA: ICD-10-CM

## 2023-07-05 LAB
BILIRUBIN, POC: NORMAL
BLOOD URINE, POC: NORMAL
CLARITY, POC: NORMAL
COLOR, POC: YELLOW
CONTROL: NORMAL
GLUCOSE URINE, POC: NORMAL
KETONES, POC: NORMAL
LEUKOCYTE EST, POC: NORMAL
NITRITE, POC: POSITIVE
PH, POC: 7
PREGNANCY TEST URINE, POC: NORMAL
PROTEIN, POC: NORMAL
SPECIFIC GRAVITY, POC: 1.02
UROBILINOGEN, POC: NORMAL

## 2023-07-05 PROCEDURE — 99214 OFFICE O/P EST MOD 30 MIN: CPT | Performed by: NURSE PRACTITIONER

## 2023-07-05 PROCEDURE — 81025 URINE PREGNANCY TEST: CPT | Performed by: NURSE PRACTITIONER

## 2023-07-05 PROCEDURE — 81002 URINALYSIS NONAUTO W/O SCOPE: CPT | Performed by: NURSE PRACTITIONER

## 2023-07-05 RX ORDER — SULFAMETHOXAZOLE AND TRIMETHOPRIM 800; 160 MG/1; MG/1
1 TABLET ORAL 2 TIMES DAILY
Qty: 14 TABLET | Refills: 0 | Status: SHIPPED | OUTPATIENT
Start: 2023-07-05 | End: 2023-07-12

## 2023-07-05 RX ORDER — PHENAZOPYRIDINE HYDROCHLORIDE 100 MG/1
100 TABLET, FILM COATED ORAL 3 TIMES DAILY PRN
Qty: 6 TABLET | Refills: 0 | Status: SHIPPED | OUTPATIENT
Start: 2023-07-05 | End: 2023-07-07

## 2023-07-05 NOTE — PROGRESS NOTES
23  Victor Manuel Buckley : 1985 Sex: female  Age 43 y.o. Subjective:  Chief Complaint   Patient presents with    Dysuria       HPI:   Victor Manuel Buckley , 43 y.o. female presents to the clinic for evaluation of dysuria x 1 cooney. Reports associated frequency, urgency, and suprapubic pressure. The patient has not taken any treatment for symptoms. The patient reports unchanged symptoms over time. The patient also reports vaginal bleeding (spotting) from recent uterine ablation. Denies flank pain, vaginal discharge, vaginal bleeding, possibility of pregnancy, or lethargy. The patient also denies headache, fever, chest pain, abdominal pain, shortness of breath, and nausea / vomiting / diarrhea. Patient's last menstrual period was 2023. ROS:   Unless otherwise stated in this report the patient's positive and negative responses for review of systems for constitutional, eyes, ENT, cardiovascular, respiratory, gastrointestinal, neurological, , musculoskeletal, and integument systems and related systems to the presenting problem are either stated in the history of present illness or were not pertinent or were negative for the symptoms and/or complaints related to the presenting medical problem. Positives and pertinent negatives as per HPI. All others reviewed and are negative. PMH:     Past Medical History:   Diagnosis Date    Epilepsy ([address])     Thyroid disease        Past Surgical History:   Procedure Laterality Date    BACK SURGERY      ENDOMETRIAL ABLATION  2023    FOOT SURGERY Left 2014    i and d with biopsy and removal of foreign body       History reviewed. No pertinent family history.     Medications:     Current Outpatient Medications:     sulfamethoxazole-trimethoprim (BACTRIM DS) 800-160 MG per tablet, Take 1 tablet by mouth 2 times daily for 7 days, Disp: 14 tablet, Rfl: 0    phenazopyridine (PYRIDIUM) 100 MG tablet, Take 1 tablet by mouth 3 times daily as needed for Pain,

## 2023-07-08 LAB
BACTERIA UR CULT: ABNORMAL
ORGANISM: ABNORMAL

## 2023-07-14 ENCOUNTER — OFFICE VISIT (OUTPATIENT)
Dept: FAMILY MEDICINE CLINIC | Age: 38
End: 2023-07-14

## 2023-07-14 VITALS
OXYGEN SATURATION: 94 % | HEART RATE: 94 BPM | SYSTOLIC BLOOD PRESSURE: 100 MMHG | DIASTOLIC BLOOD PRESSURE: 68 MMHG | HEIGHT: 64 IN | BODY MASS INDEX: 31.07 KG/M2 | TEMPERATURE: 97.4 F | RESPIRATION RATE: 16 BRPM | WEIGHT: 182 LBS

## 2023-07-14 DIAGNOSIS — R82.998 LEUKOCYTES IN URINE: ICD-10-CM

## 2023-07-14 DIAGNOSIS — N89.8 VAGINAL DISCHARGE: Primary | ICD-10-CM

## 2023-07-14 LAB
BILIRUBIN, POC: NEGATIVE
BLOOD URINE, POC: NEGATIVE
CLARITY, POC: NORMAL
COLOR, POC: YELLOW
GLUCOSE URINE, POC: NEGATIVE
KETONES, POC: NEGATIVE
LEUKOCYTE EST, POC: NORMAL
NITRITE, POC: NEGATIVE
PH, POC: 7
PROTEIN, POC: NEGATIVE
SPECIFIC GRAVITY, POC: 1.02
UROBILINOGEN, POC: NORMAL

## 2023-07-14 RX ORDER — FLUCONAZOLE 150 MG/1
TABLET ORAL
Qty: 2 TABLET | Refills: 0 | Status: SHIPPED | OUTPATIENT
Start: 2023-07-14

## 2023-07-14 NOTE — PROGRESS NOTES
23  Santos Gar : 1985 Sex: female  Age 43 y.o. Subjective:  Chief Complaint   Patient presents with    Vaginal Discharge     Pt had an ablation 23       HPI:   Santos Gar , 43 y.o. female presents to the clinic for evaluation of vaginal discharge (yellow, thick, odor) x 1 month. The patient was treated for UTI on 23 (symptoms resolved). The patient has not taken any treatment for symptoms. The patient reports unchanged symptoms over time. The patient denies vaginal irritation, mild lower abdominal cramping, and pain with intercourse. The patient denies possible STD exposure. The patient also denies dysuria, headache, fever, chest pain, abdominal pain, shortness of breath, and nausea / vomiting / diarrhea. Patient's last menstrual period was 2023. ROS:   Unless otherwise stated in this report the patient's positive and negative responses for review of systems for constitutional, eyes, ENT, cardiovascular, respiratory, gastrointestinal, neurological, , musculoskeletal, and integument systems and related systems to the presenting problem are either stated in the history of present illness or were not pertinent or were negative for the symptoms and/or complaints related to the presenting medical problem. Positives and pertinent negatives as per HPI. All others reviewed and are negative. PMH:     Past Medical History:   Diagnosis Date    Epilepsy ([address])     Thyroid disease        Past Surgical History:   Procedure Laterality Date    BACK SURGERY      ENDOMETRIAL ABLATION  2023    FOOT SURGERY Left 2014    i and d with biopsy and removal of foreign body       History reviewed. No pertinent family history.     Medications:     Current Outpatient Medications:     fluconazole (DIFLUCAN) 150 MG tablet, 1 tab po x 1 dose, may repeat in 72 hrs if still symptomatic., Disp: 2 tablet, Rfl: 0    omeprazole (PRILOSEC) 20 MG delayed release capsule, Take 1 capsule by

## 2023-07-16 LAB — BACTERIA UR CULT: NORMAL

## 2023-07-17 LAB — BACTERIA UR CULT: NORMAL

## 2023-08-17 ENCOUNTER — OFFICE VISIT (OUTPATIENT)
Dept: FAMILY MEDICINE CLINIC | Age: 38
End: 2023-08-17
Payer: OTHER GOVERNMENT

## 2023-08-17 VITALS
WEIGHT: 182 LBS | DIASTOLIC BLOOD PRESSURE: 66 MMHG | RESPIRATION RATE: 20 BRPM | HEART RATE: 89 BPM | SYSTOLIC BLOOD PRESSURE: 128 MMHG | OXYGEN SATURATION: 98 % | TEMPERATURE: 97.5 F | HEIGHT: 64 IN | BODY MASS INDEX: 31.07 KG/M2

## 2023-08-17 DIAGNOSIS — R39.9 UTI SYMPTOMS: Primary | ICD-10-CM

## 2023-08-17 LAB
BILIRUBIN, POC: NORMAL
BLOOD URINE, POC: NORMAL
CLARITY, POC: CLEAR
COLOR, POC: NORMAL
CONTROL: NORMAL
GLUCOSE URINE, POC: NORMAL
KETONES, POC: NORMAL
LEUKOCYTE EST, POC: NORMAL
NITRITE, POC: NORMAL
PH, POC: 5
PREGNANCY TEST URINE, POC: NORMAL
PROTEIN, POC: NORMAL
SPECIFIC GRAVITY, POC: >=1.03
UROBILINOGEN, POC: NORMAL

## 2023-08-17 PROCEDURE — 81002 URINALYSIS NONAUTO W/O SCOPE: CPT | Performed by: NURSE PRACTITIONER

## 2023-08-17 PROCEDURE — 99214 OFFICE O/P EST MOD 30 MIN: CPT | Performed by: NURSE PRACTITIONER

## 2023-08-17 PROCEDURE — 81025 URINE PREGNANCY TEST: CPT | Performed by: NURSE PRACTITIONER

## 2023-08-17 RX ORDER — SULFAMETHOXAZOLE AND TRIMETHOPRIM 800; 160 MG/1; MG/1
1 TABLET ORAL 2 TIMES DAILY
Qty: 6 TABLET | Refills: 0 | Status: SHIPPED | OUTPATIENT
Start: 2023-08-17 | End: 2023-08-20

## 2023-08-17 NOTE — PROGRESS NOTES
23  Rich Tian : 1985 Sex: female  Age 43 y.o. Subjective:  Chief Complaint   Patient presents with    Dysuria       HPI:   Rich Tian , 43 y.o. female presents to the clinic for evaluation of dysuria x 2 days. Reports associated frequency, urgency, and suprapubic pressure. The patient has not taken any treatment for symptoms. The patient reports unchanged symptoms over time. Denies hematuria, flank pain, vaginal discharge, vaginal bleeding, possibility of pregnancy, or lethargy. The patient also denies headache, fever, chest pain, abdominal pain, shortness of breath, and nausea / vomiting / diarrhea. Patient's last menstrual period was 2023. ROS:   Unless otherwise stated in this report the patient's positive and negative responses for review of systems for constitutional, eyes, ENT, cardiovascular, respiratory, gastrointestinal, neurological, , musculoskeletal, and integument systems and related systems to the presenting problem are either stated in the history of present illness or were not pertinent or were negative for the symptoms and/or complaints related to the presenting medical problem. Positives and pertinent negatives as per HPI. All others reviewed and are negative. PMH:     Past Medical History:   Diagnosis Date    Epilepsy ([address])     Thyroid disease        Past Surgical History:   Procedure Laterality Date    BACK SURGERY      ENDOMETRIAL ABLATION  2023    FOOT SURGERY Left 2014    i and d with biopsy and removal of foreign body       History reviewed. No pertinent family history.     Medications:     Current Outpatient Medications:     sulfamethoxazole-trimethoprim (BACTRIM DS) 800-160 MG per tablet, Take 1 tablet by mouth 2 times daily for 3 days, Disp: 6 tablet, Rfl: 0    omeprazole (PRILOSEC) 20 MG delayed release capsule, Take 1 capsule by mouth every morning (before breakfast), Disp: 90 capsule, Rfl: 1    norethindrone (MICRONOR) 0.35 MG

## 2023-08-20 LAB
CULTURE: ABNORMAL
SPECIMEN DESCRIPTION: ABNORMAL

## 2023-08-21 LAB
CULTURE: ABNORMAL
SPECIMEN DESCRIPTION: ABNORMAL

## 2023-09-01 RX ORDER — IBUPROFEN 600 MG/1
600 TABLET ORAL 3 TIMES DAILY PRN
Qty: 90 TABLET | Refills: 1 | Status: SHIPPED | OUTPATIENT
Start: 2023-09-01

## 2023-09-01 RX ORDER — NITROFURANTOIN MACROCRYSTALS 50 MG/1
CAPSULE ORAL
Qty: 30 CAPSULE | Refills: 2 | Status: SHIPPED | OUTPATIENT
Start: 2023-09-01

## 2023-09-08 ENCOUNTER — OFFICE VISIT (OUTPATIENT)
Dept: PRIMARY CARE CLINIC | Age: 38
End: 2023-09-08
Payer: OTHER GOVERNMENT

## 2023-09-08 VITALS
WEIGHT: 177 LBS | OXYGEN SATURATION: 98 % | BODY MASS INDEX: 30.22 KG/M2 | DIASTOLIC BLOOD PRESSURE: 70 MMHG | HEART RATE: 96 BPM | RESPIRATION RATE: 16 BRPM | SYSTOLIC BLOOD PRESSURE: 124 MMHG | HEIGHT: 64 IN

## 2023-09-08 DIAGNOSIS — G62.9 NEUROPATHY: ICD-10-CM

## 2023-09-08 DIAGNOSIS — G89.29 CHRONIC BILATERAL LOW BACK PAIN WITH BILATERAL SCIATICA: ICD-10-CM

## 2023-09-08 DIAGNOSIS — Z87.440 HISTORY OF RECURRENT UTIS: Primary | ICD-10-CM

## 2023-09-08 DIAGNOSIS — E78.2 MIXED HYPERLIPIDEMIA: ICD-10-CM

## 2023-09-08 DIAGNOSIS — M54.42 CHRONIC BILATERAL LOW BACK PAIN WITH BILATERAL SCIATICA: ICD-10-CM

## 2023-09-08 DIAGNOSIS — F41.9 ANXIETY: ICD-10-CM

## 2023-09-08 DIAGNOSIS — M79.7 FIBROMYALGIA: ICD-10-CM

## 2023-09-08 DIAGNOSIS — F31.9 BIPOLAR 1 DISORDER (HCC): ICD-10-CM

## 2023-09-08 DIAGNOSIS — G47.00 INSOMNIA, UNSPECIFIED TYPE: ICD-10-CM

## 2023-09-08 DIAGNOSIS — R56.9 SEIZURES (HCC): ICD-10-CM

## 2023-09-08 DIAGNOSIS — N89.8 VAGINAL DISCHARGE: ICD-10-CM

## 2023-09-08 DIAGNOSIS — E03.9 HYPOTHYROIDISM, UNSPECIFIED TYPE: ICD-10-CM

## 2023-09-08 DIAGNOSIS — M54.41 CHRONIC BILATERAL LOW BACK PAIN WITH BILATERAL SCIATICA: ICD-10-CM

## 2023-09-08 DIAGNOSIS — K21.9 GASTROESOPHAGEAL REFLUX DISEASE WITHOUT ESOPHAGITIS: ICD-10-CM

## 2023-09-08 PROBLEM — M54.2 CERVICALGIA: Status: RESOLVED | Noted: 2022-09-27 | Resolved: 2023-09-08

## 2023-09-08 PROCEDURE — 99214 OFFICE O/P EST MOD 30 MIN: CPT | Performed by: NURSE PRACTITIONER

## 2023-09-08 RX ORDER — ACETAMINOPHEN AND CODEINE PHOSPHATE 120; 12 MG/5ML; MG/5ML
1 SOLUTION ORAL DAILY
Qty: 90 TABLET | Refills: 1 | Status: SHIPPED | OUTPATIENT
Start: 2023-09-08

## 2023-09-08 RX ORDER — CARISOPRODOL 350 MG/1
350 TABLET ORAL DAILY
COMMUNITY
Start: 2023-08-28

## 2023-09-08 RX ORDER — LEVOTHYROXINE SODIUM 0.03 MG/1
25 TABLET ORAL DAILY
Qty: 90 TABLET | Refills: 1 | Status: SHIPPED | OUTPATIENT
Start: 2023-09-08 | End: 2024-03-06

## 2023-09-08 RX ORDER — NITROFURANTOIN MACROCRYSTALS 50 MG/1
CAPSULE ORAL
Qty: 30 CAPSULE | Refills: 2 | Status: SHIPPED | OUTPATIENT
Start: 2023-09-08

## 2023-09-08 RX ORDER — FLUCONAZOLE 150 MG/1
TABLET ORAL
Qty: 2 TABLET | Refills: 0 | Status: CANCELLED | OUTPATIENT
Start: 2023-09-08

## 2023-09-08 RX ORDER — OMEPRAZOLE 20 MG/1
20 CAPSULE, DELAYED RELEASE ORAL
Qty: 90 CAPSULE | Refills: 1 | Status: SHIPPED | OUTPATIENT
Start: 2023-09-08

## 2023-09-08 RX ORDER — IBUPROFEN 600 MG/1
600 TABLET ORAL 3 TIMES DAILY PRN
Qty: 90 TABLET | Refills: 1 | Status: SHIPPED | OUTPATIENT
Start: 2023-09-08

## 2023-09-08 ASSESSMENT — ENCOUNTER SYMPTOMS
BACK PAIN: 1
ABDOMINAL DISTENTION: 0
CONSTIPATION: 0
COUGH: 0
APNEA: 0
ABDOMINAL PAIN: 0
SHORTNESS OF BREATH: 0
VOMITING: 0
DIARRHEA: 0
NAUSEA: 0
VOICE CHANGE: 0
FACIAL SWELLING: 0
WHEEZING: 0
RHINORRHEA: 0
COLOR CHANGE: 0
CHEST TIGHTNESS: 0
EYES NEGATIVE: 1

## 2023-09-08 ASSESSMENT — PATIENT HEALTH QUESTIONNAIRE - PHQ9
SUM OF ALL RESPONSES TO PHQ9 QUESTIONS 1 & 2: 0
1. LITTLE INTEREST OR PLEASURE IN DOING THINGS: 0
SUM OF ALL RESPONSES TO PHQ QUESTIONS 1-9: 0
9. THOUGHTS THAT YOU WOULD BE BETTER OFF DEAD, OR OF HURTING YOURSELF: 0
10. IF YOU CHECKED OFF ANY PROBLEMS, HOW DIFFICULT HAVE THESE PROBLEMS MADE IT FOR YOU TO DO YOUR WORK, TAKE CARE OF THINGS AT HOME, OR GET ALONG WITH OTHER PEOPLE: 0
7. TROUBLE CONCENTRATING ON THINGS, SUCH AS READING THE NEWSPAPER OR WATCHING TELEVISION: 0
SUM OF ALL RESPONSES TO PHQ QUESTIONS 1-9: 0
5. POOR APPETITE OR OVEREATING: 0
8. MOVING OR SPEAKING SO SLOWLY THAT OTHER PEOPLE COULD HAVE NOTICED. OR THE OPPOSITE, BEING SO FIGETY OR RESTLESS THAT YOU HAVE BEEN MOVING AROUND A LOT MORE THAN USUAL: 0
SUM OF ALL RESPONSES TO PHQ QUESTIONS 1-9: 0
6. FEELING BAD ABOUT YOURSELF - OR THAT YOU ARE A FAILURE OR HAVE LET YOURSELF OR YOUR FAMILY DOWN: 0
2. FEELING DOWN, DEPRESSED OR HOPELESS: 0
4. FEELING TIRED OR HAVING LITTLE ENERGY: 0
SUM OF ALL RESPONSES TO PHQ QUESTIONS 1-9: 0
3. TROUBLE FALLING OR STAYING ASLEEP: 0

## 2023-09-08 NOTE — PROGRESS NOTES
Relevant Orders    Lipid Panel    Comprehensive Metabolic Panel          Continue all medication as prescribed, OARRS report reviewed and appropriate. Keep follow-up appointments with all specialist.  Jennifer Romoin as needed postcoitally to prevent recurrent UTIs. We did discuss good NILAM hygiene. Patient has documented recurrent UTIs with E. coli. A referral was placed to urology at this time. Increase activity and follow a mediterranean diet. We will repeat lab work at a later date, orders placed today. Follow a mediterranean diet to reduce risk of stroke/heart attack. This includes emphasizing intake of vegetables, fruits, legumes, nuts, whole grains, and fish. Replace saturated fats with monounsaturated and polyunsaturated fats. Avoid trans fat. Limit sodium and cholesterol in diet. Minimize processed foods, refined carbs, and sweetened drinks. Try to get 150 minutes per week of moderate intensity (brisk walking, biking 5 mph, recreational swimming and yoga) OR 75 minutes per week of vigorous intensity (jogging/running, swimming laps, biking >10mph) of aerobic activity. However, any amount of exercise is beneficial.     Counseled patient regarding above diagnosis, including possible risks and complications, especially if left uncontrolled. Counseled patient as appropriate and relevant regarding any possible side effects, risks, and alternatives to treatment; the patient and/or guardian verbalizes understanding, and is in agreement with the plan as detailed above. All educational materials and instructions were discussed and included on the After Visit Summary. All questions answered to the patient's satisfaction. The patient was advised to call or send [employer] message for any concerns prior to next appointment. Return in about 3 months (around 12/8/2023) for 3 month f/u- 30 min.   TORI Austin NP

## 2023-10-18 ENCOUNTER — OFFICE VISIT (OUTPATIENT)
Dept: FAMILY MEDICINE CLINIC | Age: 38
End: 2023-10-18
Payer: OTHER GOVERNMENT

## 2023-10-18 VITALS
DIASTOLIC BLOOD PRESSURE: 62 MMHG | WEIGHT: 179 LBS | HEIGHT: 64 IN | SYSTOLIC BLOOD PRESSURE: 100 MMHG | RESPIRATION RATE: 14 BRPM | TEMPERATURE: 98.1 F | OXYGEN SATURATION: 98 % | BODY MASS INDEX: 30.56 KG/M2 | HEART RATE: 82 BPM

## 2023-10-18 DIAGNOSIS — M79.601 RIGHT ARM PAIN: Primary | ICD-10-CM

## 2023-10-18 DIAGNOSIS — I82.611 SUPERFICIAL VENOUS THROMBOSIS OF RIGHT UPPER EXTREMITY: ICD-10-CM

## 2023-10-18 DIAGNOSIS — M79.671 RIGHT FOOT PAIN: ICD-10-CM

## 2023-10-18 PROCEDURE — 99214 OFFICE O/P EST MOD 30 MIN: CPT | Performed by: PHYSICIAN ASSISTANT

## 2023-10-18 RX ORDER — DOXYCYCLINE HYCLATE 100 MG
100 TABLET ORAL 2 TIMES DAILY
Qty: 20 TABLET | Refills: 0 | Status: SHIPPED | OUTPATIENT
Start: 2023-10-18 | End: 2023-10-28

## 2023-10-18 RX ORDER — CEFDINIR 300 MG/1
300 CAPSULE ORAL 2 TIMES DAILY
Qty: 20 CAPSULE | Refills: 0 | Status: SHIPPED | OUTPATIENT
Start: 2023-10-18 | End: 2023-10-28

## 2023-10-18 ASSESSMENT — ENCOUNTER SYMPTOMS
ABDOMINAL PAIN: 0
SORE THROAT: 0
BACK PAIN: 0
SHORTNESS OF BREATH: 0
DIARRHEA: 0
PHOTOPHOBIA: 0
COUGH: 0
VOMITING: 0
NAUSEA: 0

## 2023-10-18 NOTE — PROGRESS NOTES
10/18/23  Adeel Phelps : 1985 Sex: female  Age 43 y.o. Subjective:  Chief Complaint   Patient presents with    Arm Pain    Foot Pain     right         79-year-old female presents to the walk-in clinic for multiple complaints. She states that she has been having right foot pain since Saturday. There is no injury. She states it hurts to bear weight. She is ambulatory. She states starting at about the same time she developed right arm pain. Patient is taking over-the-counter Tylenol for the pain. She denies pregnancy. She is currently on her menses. She went to the emergency department several days ago for an allergic reaction. She did have an IV placed in her right antecubital fossa. Review of Systems   Constitutional:  Negative for chills and fever. HENT:  Negative for congestion, ear pain and sore throat. Eyes:  Negative for photophobia and visual disturbance. Respiratory:  Negative for cough and shortness of breath. Cardiovascular:  Negative for chest pain. Gastrointestinal:  Negative for abdominal pain, diarrhea, nausea and vomiting. Genitourinary:  Negative for difficulty urinating, dysuria, frequency and urgency. Musculoskeletal:  Negative for back pain, neck pain and neck stiffness. Right arm pain and right foot pain   Skin:  Negative for rash. Neurological:  Negative for dizziness, syncope, weakness, light-headedness and headaches. Hematological:  Negative for adenopathy. Does not bruise/bleed easily. Psychiatric/Behavioral:  Negative for agitation and confusion. All other systems reviewed and are negative. PMH:     Past Medical History:   Diagnosis Date    Epilepsy ([address])     Thyroid disease        Past Surgical History:   Procedure Laterality Date    BACK SURGERY      ENDOMETRIAL ABLATION  2023    FOOT SURGERY Left 2014    i and d with biopsy and removal of foreign body       History reviewed.  No pertinent family

## 2023-11-17 ENCOUNTER — TELEPHONE (OUTPATIENT)
Dept: PRIMARY CARE CLINIC | Age: 38
End: 2023-11-17

## 2023-11-17 NOTE — TELEPHONE ENCOUNTER
----- Message from Tony Hale sent at 11/17/2023 11:49 AM EST -----  Subject: Refill Request    QUESTIONS  Name of Medication? carisoprodol (SOMA) 350 MG tablet  Patient-reported dosage and instructions? 350 mg 1x a day at bedtime   How many days do you have left? 0  [id number]  Pharmacy phone number (if available)? [phone]  ---------------------------------------------------------------------------  --------------,  Name of Medication? norethindrone (MICRONOR) 0.35 MG tablet  Patient-reported dosage and instructions? 0.35 1x daily   How many days do you have left? 1  [id number]  Pharmacy phone number (if available)? [phone]  ---------------------------------------------------------------------------  --------------  CALL BACK INFO  What is the best way for the office to contact you? OK to leave message on   voicemail  Preferred Call Back Phone Number? [phone]  ---------------------------------------------------------------------------  --------------  SCRIPT ANSWERS  Relationship to Patient?  Self

## 2023-12-12 ENCOUNTER — OFFICE VISIT (OUTPATIENT)
Dept: PRIMARY CARE CLINIC | Age: 38
End: 2023-12-12
Payer: OTHER GOVERNMENT

## 2023-12-12 VITALS
HEIGHT: 64 IN | HEART RATE: 84 BPM | RESPIRATION RATE: 16 BRPM | WEIGHT: 169 LBS | SYSTOLIC BLOOD PRESSURE: 128 MMHG | DIASTOLIC BLOOD PRESSURE: 80 MMHG | OXYGEN SATURATION: 96 % | TEMPERATURE: 98.4 F | BODY MASS INDEX: 28.85 KG/M2

## 2023-12-12 DIAGNOSIS — G47.00 INSOMNIA, UNSPECIFIED TYPE: ICD-10-CM

## 2023-12-12 DIAGNOSIS — E55.9 VITAMIN D DEFICIENCY: ICD-10-CM

## 2023-12-12 DIAGNOSIS — Z23 NEED FOR VACCINATION: ICD-10-CM

## 2023-12-12 DIAGNOSIS — E78.2 MIXED HYPERLIPIDEMIA: Primary | ICD-10-CM

## 2023-12-12 DIAGNOSIS — M79.7 FIBROMYALGIA: ICD-10-CM

## 2023-12-12 DIAGNOSIS — R56.9 SEIZURES (HCC): ICD-10-CM

## 2023-12-12 DIAGNOSIS — E03.9 HYPOTHYROIDISM, UNSPECIFIED TYPE: ICD-10-CM

## 2023-12-12 PROCEDURE — 90471 IMMUNIZATION ADMIN: CPT | Performed by: NURSE PRACTITIONER

## 2023-12-12 PROCEDURE — 90674 CCIIV4 VAC NO PRSV 0.5 ML IM: CPT | Performed by: NURSE PRACTITIONER

## 2023-12-12 PROCEDURE — 99214 OFFICE O/P EST MOD 30 MIN: CPT | Performed by: NURSE PRACTITIONER

## 2023-12-12 RX ORDER — CARISOPRODOL 350 MG/1
350 TABLET ORAL DAILY
Status: CANCELLED | OUTPATIENT
Start: 2023-12-12

## 2023-12-12 RX ORDER — CARISOPRODOL 350 MG/1
350 TABLET ORAL DAILY
Qty: 30 TABLET | Refills: 2 | Status: SHIPPED | OUTPATIENT
Start: 2023-12-12 | End: 2024-03-11

## 2023-12-12 ASSESSMENT — ENCOUNTER SYMPTOMS
ABDOMINAL DISTENTION: 0
COLOR CHANGE: 0
EYES NEGATIVE: 1
NAUSEA: 0
APNEA: 0
BACK PAIN: 1
ABDOMINAL PAIN: 0
RHINORRHEA: 0
FACIAL SWELLING: 0
CHEST TIGHTNESS: 0
VOMITING: 0
VOICE CHANGE: 0
CONSTIPATION: 0
DIARRHEA: 0
COUGH: 0
WHEEZING: 0
SHORTNESS OF BREATH: 0

## 2023-12-12 NOTE — PROGRESS NOTES
Behavior: Behavior normal.         Thought Content: Thought content normal.         Judgment: Judgment normal.       Testing:     Orders Placed This Encounter   Procedures    Influenza, FLUCELVAX, (age 10 mo+), IM, PF, 0.5 mL    Comprehensive Metabolic Panel     Standing Status:   Future     Standing Expiration Date:   1/23/2024    Lipid Panel     Standing Status:   Future     Standing Expiration Date:   1/23/2024    CBC with Auto Differential     Standing Status:   Future     Standing Expiration Date:   12/12/2024    TSH     Standing Status:   Future     Standing Expiration Date:   1/23/2024    Vitamin D 25 Hydroxy     Standing Status:   Future     Standing Expiration Date:   1/23/2024      No results found for this or any previous visit (from the past 24 hour(s)). Assessment & Plan:     Problem List Items Addressed This Visit       Fibromyalgia    Relevant Medications    carisoprodol (SOMA) 350 MG tablet    Other Relevant Orders    Comprehensive Metabolic Panel    Lipid Panel    CBC with Auto Differential    TSH    Seizures (HCC)    Relevant Orders    Comprehensive Metabolic Panel    Lipid Panel    CBC with Auto Differential    TSH    Insomnia    Relevant Medications    carisoprodol (SOMA) 350 MG tablet    Hypothyroidism     Other Visit Diagnoses       Mixed hyperlipidemia    -  Primary    Relevant Orders    Comprehensive Metabolic Panel    Lipid Panel    CBC with Auto Differential    TSH    Need for vaccination        Relevant Orders    Comprehensive Metabolic Panel    Lipid Panel    CBC with Auto Differential    TSH    Vitamin D 25 Hydroxy    Influenza, FLUCELVAX, (age 10 mo+), IM, PF, 0.5 mL (Completed)    Vitamin D deficiency        Relevant Orders    Vitamin D 25 Hydroxy        Continue all medication as prescribed, OARRS report reviewed and appropriate. Keep follow-up appointments with all specialist.  Jullie Fee as needed postcoitally. Increase activity and follow a mediterranean diet.   We will repeat

## 2024-01-02 RX ORDER — IBUPROFEN 600 MG/1
600 TABLET ORAL 3 TIMES DAILY PRN
Qty: 90 TABLET | Refills: 1 | OUTPATIENT
Start: 2024-01-02

## 2024-01-02 NOTE — TELEPHONE ENCOUNTER
Last Appointment:  12/12/2023  Future Appointments   Date Time Provider Department Center   1/8/2024 10:30 AM Elvis Sheppard MD [facility]   3/12/2024 12:30 PM Funmilayo Crespo APRN - NP [facility]

## 2024-01-15 ENCOUNTER — TELEPHONE (OUTPATIENT)
Dept: PRIMARY CARE CLINIC | Age: 39
End: 2024-01-15

## 2024-01-15 DIAGNOSIS — G47.00 INSOMNIA, UNSPECIFIED TYPE: ICD-10-CM

## 2024-01-15 RX ORDER — PREGABALIN 50 MG/1
CAPSULE ORAL
Qty: 90 CAPSULE | Refills: 1 | Status: CANCELLED | OUTPATIENT
Start: 2024-01-15 | End: 2024-02-15

## 2024-01-15 RX ORDER — CARISOPRODOL 350 MG/1
350 TABLET ORAL DAILY
Qty: 30 TABLET | Refills: 2 | Status: SHIPPED | OUTPATIENT
Start: 2024-01-15 | End: 2024-04-14

## 2024-01-15 NOTE — TELEPHONE ENCOUNTER
Clarice called requesting refills of:    Lyrica 50 MG  Soma 350MG    She stated she is completely out    Amol Dickens

## 2024-03-01 ENCOUNTER — OFFICE VISIT (OUTPATIENT)
Dept: FAMILY MEDICINE CLINIC | Age: 39
End: 2024-03-01
Payer: OTHER GOVERNMENT

## 2024-03-01 VITALS
RESPIRATION RATE: 18 BRPM | HEIGHT: 64 IN | OXYGEN SATURATION: 97 % | BODY MASS INDEX: 27.14 KG/M2 | TEMPERATURE: 97.4 F | HEART RATE: 76 BPM | WEIGHT: 159 LBS | SYSTOLIC BLOOD PRESSURE: 116 MMHG | DIASTOLIC BLOOD PRESSURE: 60 MMHG

## 2024-03-01 DIAGNOSIS — J02.9 SORE THROAT: ICD-10-CM

## 2024-03-01 DIAGNOSIS — J02.0 STREP THROAT: Primary | ICD-10-CM

## 2024-03-01 LAB — S PYO AG THROAT QL: POSITIVE

## 2024-03-01 PROCEDURE — 99213 OFFICE O/P EST LOW 20 MIN: CPT | Performed by: NURSE PRACTITIONER

## 2024-03-01 PROCEDURE — 87880 STREP A ASSAY W/OPTIC: CPT | Performed by: NURSE PRACTITIONER

## 2024-03-01 RX ORDER — CEFDINIR 300 MG/1
300 CAPSULE ORAL 2 TIMES DAILY
Qty: 20 CAPSULE | Refills: 0 | Status: SHIPPED | OUTPATIENT
Start: 2024-03-01 | End: 2024-03-11

## 2024-03-01 NOTE — PROGRESS NOTES
medical history have been reviewed.  Clarice was seen today for pharyngitis.    Diagnoses and all orders for this visit:    Strep throat  -     cefdinir (OMNICEF) 300 MG capsule; Take 1 capsule by mouth 2 times daily for 10 days    Sore throat  -     POCT rapid strep A        - Disposition: Home    - Educational material printed for patient's review and were included in patient instructions. After Visit Summary was given to patient at the end of visit.    - COVID-19 test declined today. Encouraged oral fluids and rest. Discussed symptomatic treatments with patient today (Tylenol prn pain / fever, Zyrtec prn rhinitis, Cepacol lozenges prn sore throat). The patient is to follow-up with PCP in the next 2-3 days for reevaluation. Red flag symptoms were also discussed with the patient today. If symptoms worsen the patient is to go directly to the emergency department for reevaluation and treatment. Pt verbalizes understanding and is in agreement with plan of care. All questions answered.    SIGNATURE: Axel Beckett, TORI-CNP    *NOTE: This report was transcribed using voice recognition software. Every effort was made to ensure accuracy; however, inadvertent computerized transcription errors may be present.

## 2024-03-12 ENCOUNTER — OFFICE VISIT (OUTPATIENT)
Dept: PRIMARY CARE CLINIC | Age: 39
End: 2024-03-12
Payer: OTHER GOVERNMENT

## 2024-03-12 VITALS
OXYGEN SATURATION: 96 % | BODY MASS INDEX: 28.88 KG/M2 | DIASTOLIC BLOOD PRESSURE: 72 MMHG | SYSTOLIC BLOOD PRESSURE: 124 MMHG | HEART RATE: 89 BPM | WEIGHT: 163 LBS | RESPIRATION RATE: 16 BRPM | HEIGHT: 63 IN | TEMPERATURE: 97.3 F

## 2024-03-12 DIAGNOSIS — E03.9 HYPOTHYROIDISM, UNSPECIFIED TYPE: ICD-10-CM

## 2024-03-12 DIAGNOSIS — M54.41 CHRONIC BILATERAL LOW BACK PAIN WITH BILATERAL SCIATICA: ICD-10-CM

## 2024-03-12 DIAGNOSIS — E78.2 MIXED HYPERLIPIDEMIA: ICD-10-CM

## 2024-03-12 DIAGNOSIS — M79.7 FIBROMYALGIA: ICD-10-CM

## 2024-03-12 DIAGNOSIS — M54.42 CHRONIC BILATERAL LOW BACK PAIN WITH BILATERAL SCIATICA: ICD-10-CM

## 2024-03-12 DIAGNOSIS — R56.9 SEIZURES (HCC): ICD-10-CM

## 2024-03-12 DIAGNOSIS — F41.9 ANXIETY: ICD-10-CM

## 2024-03-12 DIAGNOSIS — F31.9 BIPOLAR 1 DISORDER (HCC): ICD-10-CM

## 2024-03-12 DIAGNOSIS — K21.9 GASTROESOPHAGEAL REFLUX DISEASE WITHOUT ESOPHAGITIS: ICD-10-CM

## 2024-03-12 DIAGNOSIS — G89.29 CHRONIC BILATERAL LOW BACK PAIN WITH BILATERAL SCIATICA: ICD-10-CM

## 2024-03-12 DIAGNOSIS — G47.00 INSOMNIA, UNSPECIFIED TYPE: ICD-10-CM

## 2024-03-12 DIAGNOSIS — R56.9 SEIZURES (HCC): Primary | ICD-10-CM

## 2024-03-12 DIAGNOSIS — G62.9 NEUROPATHY: ICD-10-CM

## 2024-03-12 LAB
ABSOLUTE IMMATURE GRANULOCYTE: 0.05 K/UL (ref 0–0.58)
ALBUMIN SERPL-MCNC: 4 G/DL (ref 3.5–5.2)
ALP BLD-CCNC: 93 U/L (ref 35–104)
ALT SERPL-CCNC: 27 U/L (ref 0–32)
ANION GAP SERPL CALCULATED.3IONS-SCNC: 16 MMOL/L (ref 7–16)
AST SERPL-CCNC: 25 U/L (ref 0–31)
BASOPHILS ABSOLUTE: 0.07 K/UL (ref 0–0.2)
BASOPHILS RELATIVE PERCENT: 1 % (ref 0–2)
BILIRUB SERPL-MCNC: 0.3 MG/DL (ref 0–1.2)
BUN BLDV-MCNC: 11 MG/DL (ref 6–20)
CALCIUM SERPL-MCNC: 9 MG/DL (ref 8.6–10.2)
CHLORIDE BLD-SCNC: 110 MMOL/L (ref 98–107)
CHOLESTEROL: 204 MG/DL
CO2: 17 MMOL/L (ref 22–29)
CREAT SERPL-MCNC: 1 MG/DL (ref 0.5–1)
EOSINOPHILS ABSOLUTE: 0.05 K/UL (ref 0.05–0.5)
EOSINOPHILS RELATIVE PERCENT: 1 % (ref 0–6)
GFR SERPL CREATININE-BSD FRML MDRD: >60 ML/MIN/1.73M2
GLUCOSE BLD-MCNC: 86 MG/DL (ref 74–99)
HCT VFR BLD CALC: 44.6 % (ref 34–48)
HDLC SERPL-MCNC: 39 MG/DL
HEMOGLOBIN: 14.1 G/DL (ref 11.5–15.5)
IMMATURE GRANULOCYTES: 1 % (ref 0–5)
LDL CHOLESTEROL: 146 MG/DL
LYMPHOCYTES ABSOLUTE: 2.59 K/UL (ref 1.5–4)
LYMPHOCYTES RELATIVE PERCENT: 31 % (ref 20–42)
MCH RBC QN AUTO: 30.1 PG (ref 26–35)
MCHC RBC AUTO-ENTMCNC: 31.6 G/DL (ref 32–34.5)
MCV RBC AUTO: 95.3 FL (ref 80–99.9)
MONOCYTES ABSOLUTE: 0.5 K/UL (ref 0.1–0.95)
MONOCYTES RELATIVE PERCENT: 6 % (ref 2–12)
NEUTROPHILS ABSOLUTE: 5.09 K/UL (ref 1.8–7.3)
NEUTROPHILS RELATIVE PERCENT: 61 % (ref 43–80)
PDW BLD-RTO: 14.7 % (ref 11.5–15)
PLATELET # BLD: 392 K/UL (ref 130–450)
PMV BLD AUTO: 10 FL (ref 7–12)
POTASSIUM SERPL-SCNC: 4.5 MMOL/L (ref 3.5–5)
RBC # BLD: 4.68 M/UL (ref 3.5–5.5)
SODIUM BLD-SCNC: 143 MMOL/L (ref 132–146)
T4 FREE: 1.1 NG/DL (ref 0.9–1.7)
TOTAL PROTEIN: 6.8 G/DL (ref 6.4–8.3)
TRIGL SERPL-MCNC: 94 MG/DL
TSH SERPL DL<=0.05 MIU/L-ACNC: 1.95 UIU/ML (ref 0.27–4.2)
VITAMIN D 25-HYDROXY: 17.4 NG/ML (ref 30–100)
VLDLC SERPL CALC-MCNC: 19 MG/DL
WBC # BLD: 8.4 K/UL (ref 4.5–11.5)

## 2024-03-12 PROCEDURE — 99214 OFFICE O/P EST MOD 30 MIN: CPT | Performed by: NURSE PRACTITIONER

## 2024-03-12 RX ORDER — CARISOPRODOL 350 MG/1
350 TABLET ORAL DAILY
Qty: 30 TABLET | Refills: 2 | Status: SHIPPED | OUTPATIENT
Start: 2024-03-26 | End: 2024-06-24

## 2024-03-12 RX ORDER — ACETAMINOPHEN AND CODEINE PHOSPHATE 120; 12 MG/5ML; MG/5ML
1 SOLUTION ORAL DAILY
Qty: 90 TABLET | Refills: 1 | Status: CANCELLED | OUTPATIENT
Start: 2024-03-12

## 2024-03-12 RX ORDER — LEVOTHYROXINE SODIUM 0.03 MG/1
25 TABLET ORAL DAILY
Qty: 90 TABLET | Refills: 1 | Status: SHIPPED | OUTPATIENT
Start: 2024-03-12 | End: 2024-09-08

## 2024-03-12 RX ORDER — OMEPRAZOLE 20 MG/1
20 CAPSULE, DELAYED RELEASE ORAL
Qty: 90 CAPSULE | Refills: 1 | Status: SHIPPED | OUTPATIENT
Start: 2024-03-12

## 2024-03-12 SDOH — ECONOMIC STABILITY: FOOD INSECURITY: WITHIN THE PAST 12 MONTHS, THE FOOD YOU BOUGHT JUST DIDN'T LAST AND YOU DIDN'T HAVE MONEY TO GET MORE.: NEVER TRUE

## 2024-03-12 SDOH — ECONOMIC STABILITY: INCOME INSECURITY: HOW HARD IS IT FOR YOU TO PAY FOR THE VERY BASICS LIKE FOOD, HOUSING, MEDICAL CARE, AND HEATING?: NOT HARD AT ALL

## 2024-03-12 SDOH — ECONOMIC STABILITY: FOOD INSECURITY: WITHIN THE PAST 12 MONTHS, YOU WORRIED THAT YOUR FOOD WOULD RUN OUT BEFORE YOU GOT MONEY TO BUY MORE.: NEVER TRUE

## 2024-03-12 ASSESSMENT — PATIENT HEALTH QUESTIONNAIRE - PHQ9
7. TROUBLE CONCENTRATING ON THINGS, SUCH AS READING THE NEWSPAPER OR WATCHING TELEVISION: 0
1. LITTLE INTEREST OR PLEASURE IN DOING THINGS: 0
9. THOUGHTS THAT YOU WOULD BE BETTER OFF DEAD, OR OF HURTING YOURSELF: 0
SUM OF ALL RESPONSES TO PHQ QUESTIONS 1-9: 0
SUM OF ALL RESPONSES TO PHQ QUESTIONS 1-9: 0
8. MOVING OR SPEAKING SO SLOWLY THAT OTHER PEOPLE COULD HAVE NOTICED. OR THE OPPOSITE, BEING SO FIGETY OR RESTLESS THAT YOU HAVE BEEN MOVING AROUND A LOT MORE THAN USUAL: 0
SUM OF ALL RESPONSES TO PHQ QUESTIONS 1-9: 0
6. FEELING BAD ABOUT YOURSELF - OR THAT YOU ARE A FAILURE OR HAVE LET YOURSELF OR YOUR FAMILY DOWN: 0
5. POOR APPETITE OR OVEREATING: 0
4. FEELING TIRED OR HAVING LITTLE ENERGY: 0
2. FEELING DOWN, DEPRESSED OR HOPELESS: 0
3. TROUBLE FALLING OR STAYING ASLEEP: 0
10. IF YOU CHECKED OFF ANY PROBLEMS, HOW DIFFICULT HAVE THESE PROBLEMS MADE IT FOR YOU TO DO YOUR WORK, TAKE CARE OF THINGS AT HOME, OR GET ALONG WITH OTHER PEOPLE: 0
SUM OF ALL RESPONSES TO PHQ QUESTIONS 1-9: 0
SUM OF ALL RESPONSES TO PHQ9 QUESTIONS 1 & 2: 0

## 2024-03-12 ASSESSMENT — ENCOUNTER SYMPTOMS
CONSTIPATION: 0
DIARRHEA: 0
WHEEZING: 0
SHORTNESS OF BREATH: 0
BACK PAIN: 1
ABDOMINAL PAIN: 0
FACIAL SWELLING: 0
CHEST TIGHTNESS: 0
NAUSEA: 0
RHINORRHEA: 0
VOICE CHANGE: 0
ABDOMINAL DISTENTION: 0
APNEA: 0
COUGH: 0
EYES NEGATIVE: 1
COLOR CHANGE: 0
VOMITING: 0

## 2024-03-12 NOTE — PROGRESS NOTES
(brisk walking, biking 5 mph, recreational swimming and yoga) OR 75 minutes per week of vigorous intensity (jogging/running, swimming laps, biking >10mph) of aerobic activity. However, any amount of exercise is beneficial.     Counseled patient regarding above diagnosis, including possible risks and complications, especially if left uncontrolled.  Counseled patient as appropriate and relevant regarding any possible side effects, risks, and alternatives to treatment; the patient and/or guardian verbalizes understanding, and is in agreement with the plan as detailed above.   All educational materials and instructions were discussed and included on the After Visit Summary.  All questions answered to the patient's satisfaction.  The patient was advised to call or send Jo[facility] message for any concerns prior to next appointment.    Return in about 3 months (around 6/12/2024).  TORI Joshi NP

## 2024-03-13 NOTE — ASSESSMENT & PLAN NOTE
continue following with psychiatry.  This is managed and controlled with current medication regiment.

## 2024-03-13 NOTE — PROGRESS NOTES
The 10-year ASCVD risk score (Yuan RAO, et al., 2019) is: 4.5%    Values used to calculate the score:      Age: 39 years      Sex: Female      Is Non- : No      Diabetic: No      Tobacco smoker: Yes      Systolic Blood Pressure: 124 mmHg      Is BP treated: No      HDL Cholesterol: 39 mg/dL      Total Cholesterol: 204 mg/dL

## 2024-03-13 NOTE — ASSESSMENT & PLAN NOTE
Controlled and at goal.  Continue levothyroxine 25 mcg daily.  Patient is due for repeat lab work.

## 2024-03-15 ENCOUNTER — HOSPITAL ENCOUNTER (OUTPATIENT)
Age: 39
Discharge: HOME | End: 2024-03-17

## 2024-03-15 LAB
ABO + RH BLD: NORMAL
ARM BAND NUMBER: NORMAL
BLOOD BANK SAMPLE EXPIRATION: NORMAL
BLOOD GROUP ANTIBODIES SERPL: NEGATIVE
ERYTHROCYTE [DISTWIDTH] IN BLOOD BY AUTOMATED COUNT: 14.6 % (ref 11.5–15)
HCT VFR BLD AUTO: 45 % (ref 34–48)
HGB BLD-MCNC: 14.3 G/DL (ref 11.5–15.5)
MCH RBC QN AUTO: 30.5 PG (ref 26–35)
MCHC RBC AUTO-ENTMCNC: 31.8 G/DL (ref 32–34.5)
MCV RBC AUTO: 95.9 FL (ref 80–99.9)
PLATELET # BLD AUTO: 420 K/UL (ref 130–450)
PMV BLD AUTO: 9.9 FL (ref 7–12)
RBC # BLD AUTO: 4.69 M/UL (ref 3.5–5.5)
WBC OTHER # BLD: 10.4 K/UL (ref 4.5–11.5)

## 2024-03-15 PROCEDURE — 86850 RBC ANTIBODY SCREEN: CPT

## 2024-03-15 PROCEDURE — 85027 COMPLETE CBC AUTOMATED: CPT

## 2024-03-15 PROCEDURE — 86900 BLOOD TYPING SEROLOGIC ABO: CPT

## 2024-03-15 PROCEDURE — 86901 BLOOD TYPING SEROLOGIC RH(D): CPT

## 2024-03-27 ENCOUNTER — HOSPITAL ENCOUNTER (OUTPATIENT)
Age: 39
Discharge: HOME | End: 2024-03-29

## 2024-03-27 LAB
BACTERIA URNS QL MICRO: ABNORMAL
BILIRUB UR QL STRIP: NEGATIVE
CLARITY UR: CLEAR
COLOR UR: YELLOW
EPI CELLS #/AREA URNS HPF: ABNORMAL /HPF
GLUCOSE UR STRIP-MCNC: NEGATIVE MG/DL
HGB UR QL STRIP.AUTO: ABNORMAL
KETONES UR STRIP-MCNC: ABNORMAL MG/DL
LEUKOCYTE ESTERASE UR QL STRIP: NEGATIVE
NITRITE UR QL STRIP: NEGATIVE
PH UR STRIP: 6 [PH] (ref 5–9)
PROT UR STRIP-MCNC: ABNORMAL MG/DL
RBC #/AREA URNS HPF: ABNORMAL /HPF
SP GR UR STRIP: >1.03 (ref 1–1.03)
UROBILINOGEN UR STRIP-ACNC: 0.2 EU/DL (ref 0–1)
WBC #/AREA URNS HPF: ABNORMAL /HPF

## 2024-03-27 PROCEDURE — 87086 URINE CULTURE/COLONY COUNT: CPT

## 2024-03-27 PROCEDURE — 81001 URINALYSIS AUTO W/SCOPE: CPT

## 2024-03-28 ENCOUNTER — HOSPITAL ENCOUNTER (OUTPATIENT)
Age: 39
Discharge: HOME | End: 2024-03-30

## 2024-03-28 LAB
ANION GAP SERPL CALCULATED.3IONS-SCNC: 9 MMOL/L (ref 7–16)
BUN SERPL-MCNC: 8 MG/DL (ref 6–20)
CALCIUM SERPL-MCNC: 8.6 MG/DL (ref 8.6–10.2)
CHLORIDE SERPL-SCNC: 112 MMOL/L (ref 98–107)
CO2 SERPL-SCNC: 20 MMOL/L (ref 22–29)
CREAT SERPL-MCNC: 0.9 MG/DL (ref 0.5–1)
ERYTHROCYTE [DISTWIDTH] IN BLOOD BY AUTOMATED COUNT: 14.3 % (ref 11.5–15)
GFR SERPL CREATININE-BSD FRML MDRD: 86 ML/MIN/1.73M2
GLUCOSE SERPL-MCNC: 90 MG/DL (ref 74–99)
HCT VFR BLD AUTO: 42.9 % (ref 34–48)
HGB BLD-MCNC: 13.7 G/DL (ref 11.5–15.5)
MCH RBC QN AUTO: 30 PG (ref 26–35)
MCHC RBC AUTO-ENTMCNC: 31.9 G/DL (ref 32–34.5)
MCV RBC AUTO: 93.9 FL (ref 80–99.9)
MICROORGANISM SPEC CULT: ABNORMAL
MICROORGANISM SPEC CULT: ABNORMAL
PLATELET # BLD AUTO: 386 K/UL (ref 130–450)
PMV BLD AUTO: 10.9 FL (ref 7–12)
POTASSIUM SERPL-SCNC: 4.4 MMOL/L (ref 3.5–5)
RBC # BLD AUTO: 4.57 M/UL (ref 3.5–5.5)
SODIUM SERPL-SCNC: 141 MMOL/L (ref 132–146)
SPECIMEN DESCRIPTION: ABNORMAL
WBC OTHER # BLD: 17.7 K/UL (ref 4.5–11.5)

## 2024-03-28 PROCEDURE — 80048 BASIC METABOLIC PNL TOTAL CA: CPT

## 2024-03-28 PROCEDURE — 85027 COMPLETE CBC AUTOMATED: CPT

## 2024-04-01 LAB — SURGICAL PATHOLOGY REPORT: NORMAL

## 2024-04-08 ENCOUNTER — OFFICE VISIT (OUTPATIENT)
Dept: FAMILY MEDICINE CLINIC | Age: 39
End: 2024-04-08
Payer: OTHER GOVERNMENT

## 2024-04-08 VITALS
HEART RATE: 114 BPM | HEIGHT: 63 IN | RESPIRATION RATE: 18 BRPM | WEIGHT: 160 LBS | TEMPERATURE: 96.8 F | BODY MASS INDEX: 28.35 KG/M2 | SYSTOLIC BLOOD PRESSURE: 90 MMHG | DIASTOLIC BLOOD PRESSURE: 72 MMHG | OXYGEN SATURATION: 94 %

## 2024-04-08 DIAGNOSIS — J02.0 ACUTE STREPTOCOCCAL PHARYNGITIS: Primary | ICD-10-CM

## 2024-04-08 DIAGNOSIS — M79.7 FIBROMYALGIA: ICD-10-CM

## 2024-04-08 DIAGNOSIS — K13.70 ORAL LESION: ICD-10-CM

## 2024-04-08 LAB — S PYO AG THROAT QL: POSITIVE

## 2024-04-08 PROCEDURE — G2211 COMPLEX E/M VISIT ADD ON: HCPCS | Performed by: NURSE PRACTITIONER

## 2024-04-08 PROCEDURE — 99213 OFFICE O/P EST LOW 20 MIN: CPT | Performed by: NURSE PRACTITIONER

## 2024-04-08 PROCEDURE — 87880 STREP A ASSAY W/OPTIC: CPT | Performed by: NURSE PRACTITIONER

## 2024-04-08 RX ORDER — AZITHROMYCIN 250 MG/1
TABLET, FILM COATED ORAL
Qty: 6 TABLET | Refills: 0 | Status: SHIPPED | OUTPATIENT
Start: 2024-04-08 | End: 2024-04-18

## 2024-04-08 ASSESSMENT — ENCOUNTER SYMPTOMS
ABDOMINAL PAIN: 0
DIARRHEA: 0
CONSTIPATION: 0
COUGH: 0
SHORTNESS OF BREATH: 0
CHEST TIGHTNESS: 0

## 2024-04-08 NOTE — PROGRESS NOTES
External ear normal.      Nose: Nose normal.      Mouth/Throat:      Mouth: Mucous membranes are moist.      Tongue: Lesions (strawberry) present.      Tonsils: No tonsillar exudate. 2+ on the right. 2+ on the left.   Eyes:      Conjunctiva/sclera: Conjunctivae normal.      Pupils: Pupils are equal, round, and reactive to light.   Pulmonary:      Effort: Pulmonary effort is normal.   Musculoskeletal:         General: Normal range of motion.      Cervical back: Normal range of motion and neck supple.   Skin:     General: Skin is warm and dry.   Neurological:      Mental Status: She is alert and oriented to person, place, and time.   Psychiatric:         Mood and Affect: Mood normal.         Behavior: Behavior normal.         Test Results Section   (All laboratory and radiology results have been personally reviewed by myself)  Labs:  Results for orders placed or performed in visit on 04/08/24   POCT rapid strep A   Result Value Ref Range    Strep A Ag Positive (A) None Detected        Imaging:  All Radiology results interpreted by Radiologist unless otherwise noted.  No results found.      Assessment / Plan   Impression(s):  Clarice was seen today for mouth lesions.    Diagnoses and all orders for this visit:    Acute streptococcal pharyngitis  -     azithromycin (ZITHROMAX) 250 MG tablet; 500mg on day 1 followed by 250mg on days 2 - 5    Oral lesion  -     POCT rapid strep A    Fibromyalgia      Rapid strep in office is positive. The patient will be started on amoxicillin, side effects and administration instructions discussed.  Patient advised to dispose of toothbrush after 24 hours of antibiotic therapy. New toothbrush to be used during duration of antibiotics. Change toothbrush again once antibiotics are complete. No sharing drinks, cups, utensils. Patient aware that they are contagious for up to 24 hours after the start of antibiotics. Salt water gargles as needed.      The patient is to schedule a follow-up with

## 2024-04-15 ENCOUNTER — TELEPHONE (OUTPATIENT)
Dept: PRIMARY CARE CLINIC | Age: 39
End: 2024-04-15

## 2024-04-15 NOTE — TELEPHONE ENCOUNTER
Pt called in, she saw you through walk in on 4/8/24, and states the sore on her tongue is worse.   Please advise any advise or instructions.

## 2024-04-16 NOTE — TELEPHONE ENCOUNTER
Spoke with pt, she stated the sore is not worse, it's just still there. She does not want to come in to [facility] because she has already been seen for this. She just wants you to call something in.

## 2024-04-17 ENCOUNTER — OFFICE VISIT (OUTPATIENT)
Dept: FAMILY MEDICINE CLINIC | Age: 39
End: 2024-04-17

## 2024-04-17 VITALS
DIASTOLIC BLOOD PRESSURE: 68 MMHG | TEMPERATURE: 97.3 F | OXYGEN SATURATION: 99 % | HEART RATE: 107 BPM | RESPIRATION RATE: 18 BRPM | BODY MASS INDEX: 26.98 KG/M2 | HEIGHT: 64 IN | WEIGHT: 158 LBS | SYSTOLIC BLOOD PRESSURE: 114 MMHG

## 2024-04-17 DIAGNOSIS — J02.9 SORE THROAT: ICD-10-CM

## 2024-04-17 DIAGNOSIS — F31.9 BIPOLAR 1 DISORDER (HCC): ICD-10-CM

## 2024-04-17 DIAGNOSIS — B37.0 THRUSH: Primary | ICD-10-CM

## 2024-04-17 LAB — S PYO AG THROAT QL: NORMAL

## 2024-04-17 ASSESSMENT — ENCOUNTER SYMPTOMS
ABDOMINAL PAIN: 0
CONSTIPATION: 0
CHEST TIGHTNESS: 0
SHORTNESS OF BREATH: 0
COUGH: 0
DIARRHEA: 0

## 2024-04-17 NOTE — PROGRESS NOTES
Chief Complaint:   Mouth Lesions (Strep 2 weeks ago)      History of Present Illness   HPI:  Clarice Rehman is a 39 y.o. female who presents to [facility] today for tongue lesions.  Patient states the lesions developed approximately 2 weeks ago.  She was seen in walk-in care and was treated for strep.  She took azithromycin in its entirety.  She has tried nothing OTC with minimal relief.   Denies any fever, chills, dysphagia, trismus, chest pain, shortness of breath or lethargy.    Prior to Visit Medications    Medication Sig Taking? Authorizing Provider   nystatin (MYCOSTATIN) 260662 UNIT/ML suspension Take 5 mLs by mouth 4 times daily for 14 days Yes Funmilayo Crespo APRN - NP   Magic Mouthwash (MIRACLE MOUTHWASH) Swish and spit 5 mLs 4 times daily as needed for Irritation Yes Funmilayo Crespo APRN - NP   vitamin D (CHOLECALCIFEROL) 25 MCG (1000 UT) TABS tablet Take 1 tablet by mouth daily Yes Funmilayo Crespo APRN - NP   omeprazole (PRILOSEC) 20 MG delayed release capsule Take 1 capsule by mouth every morning (before breakfast) Yes Funmilayo Crespo APRN - NP   levothyroxine (SYNTHROID) 25 MCG tablet Take 1 tablet by mouth Daily Yes Funmilayo Crespo APRN - NP   carisoprodol (SOMA) 350 MG tablet Take 1 tablet by mouth daily for 90 days. Max Daily Amount: 350 mg Yes Funmilayo Crespo APRN - NP   pregabalin (LYRICA) 200 MG capsule TAKE 1 CAPSULE BY MOUTH TWICE DAILY Yes Provider, MD Olya   clonazePAM (KLONOPIN) 0.5 MG tablet TAKE 1 TABLET BY MOUTH THREE TIMES DAILY Yes ProviderOlya MD   levETIRAcetam (KEPPRA) 500 MG tablet TAKE 1 TABLET BY MOUTH EVERY MORNING AND 2 TABLETS EVERY EVENING Yes ProviderOlya MD   azithromycin (ZITHROMAX) 250 MG tablet 500mg on day 1 followed by 250mg on days 2 - 5  Patient not taking: Reported on 4/17/2024  Funmilayo Crespo APRN - NP   norethindrone (MICRONOR) 0.35 MG tablet Take 1 tablet by mouth daily  Patient not taking: Reported on 4/8/2024  Funmilayo Crespo APRN - NP

## 2024-05-22 ENCOUNTER — OFFICE VISIT (OUTPATIENT)
Dept: FAMILY MEDICINE CLINIC | Age: 39
End: 2024-05-22

## 2024-05-22 VITALS
BODY MASS INDEX: 26.98 KG/M2 | DIASTOLIC BLOOD PRESSURE: 64 MMHG | HEART RATE: 80 BPM | HEIGHT: 64 IN | TEMPERATURE: 97.8 F | WEIGHT: 158 LBS | OXYGEN SATURATION: 96 % | SYSTOLIC BLOOD PRESSURE: 110 MMHG | RESPIRATION RATE: 18 BRPM

## 2024-05-22 DIAGNOSIS — J02.9 SORE THROAT: ICD-10-CM

## 2024-05-22 DIAGNOSIS — J02.0 STREP THROAT: ICD-10-CM

## 2024-05-22 DIAGNOSIS — R30.0 DYSURIA: ICD-10-CM

## 2024-05-22 DIAGNOSIS — N39.0 UTI (URINARY TRACT INFECTION), UNCOMPLICATED: Primary | ICD-10-CM

## 2024-05-22 LAB
BILIRUBIN, POC: NORMAL
BLOOD URINE, POC: NORMAL
CLARITY, POC: CLEAR
COLOR, POC: YELLOW
CONTROL: NORMAL
GLUCOSE URINE, POC: NORMAL
KETONES, POC: NORMAL
LEUKOCYTE EST, POC: NORMAL
NITRITE, POC: POSITIVE
PH, POC: 6.5
PREGNANCY TEST URINE, POC: NORMAL
PROTEIN, POC: NORMAL
S PYO AG THROAT QL: POSITIVE
SPECIFIC GRAVITY, POC: >=1.03
UROBILINOGEN, POC: NORMAL

## 2024-05-22 RX ORDER — CEFDINIR 300 MG/1
300 CAPSULE ORAL 2 TIMES DAILY
Qty: 20 CAPSULE | Refills: 0 | Status: SHIPPED | OUTPATIENT
Start: 2024-05-22 | End: 2024-06-01

## 2024-05-22 NOTE — PROGRESS NOTES
May 22, 2024     Clarice Rehman 39 y.o. female    : 1985  Chief Complaint:   Dysuria and Mouth Lesions      History of Present Illness   Source of history provided by:  patient.    Clarice Rehman is a 39 y.o. old female who presents to the [facility] care with complaints of dysuria x 2 days. Reports associated frequency, urgency, and suprapubic pressure. Denies gross hematuria.  No associated flank pain. Denies any fever, chills, vaginal discharge, vaginal bleeding, possibility of pregnancy, vomiting, diarrhea, or lethargy.  Patient's last menstrual period was 2023. Also reports she has a recurrent mouth infection that has been intermittent for months. Reports she noticed \"bumps\" on tongue c/o burning to tongue.  Does have mild sore throat.    ROS   Past Medical History:   Past Medical History:   Diagnosis Date    Epilepsy (HCC)     Thyroid disease      Past Surgical History:   Past Surgical History:   Procedure Laterality Date    BACK SURGERY      ENDOMETRIAL ABLATION  2023    FOOT SURGERY Left 2014    i and d with biopsy and removal of foreign body    HYSTERECTOMY (CERVIX STATUS UNKNOWN)       Social History:  reports that she has been smoking cigarettes. She has never used smokeless tobacco. She reports current alcohol use.  Family History: family history is not on file.   Allergies: Amoxicillin-pot clavulanate, Diflucan [fluconazole], Vicodin [hydrocodone-acetaminophen], and Hydrocodone-acetaminophen    Unless otherwise stated in this report the patient's positive and negative responses for review of systems for constitutional, eyes, ENT, cardiovascular, respiratory, gastrointestinal, neurological, , musculoskeletal, and integument systems and related systems to the presenting problem are either stated in the history of present illness or were not pertinent or were negative for the symptoms and/or complaints related to the presenting medical problem.  Positives and pertinent negatives

## 2024-05-25 LAB
CULTURE: ABNORMAL
SPECIMEN DESCRIPTION: ABNORMAL

## 2024-06-05 ENCOUNTER — OFFICE VISIT (OUTPATIENT)
Dept: FAMILY MEDICINE CLINIC | Age: 39
End: 2024-06-05
Payer: OTHER GOVERNMENT

## 2024-06-05 VITALS
HEIGHT: 64 IN | RESPIRATION RATE: 18 BRPM | BODY MASS INDEX: 26.98 KG/M2 | SYSTOLIC BLOOD PRESSURE: 120 MMHG | HEART RATE: 105 BPM | TEMPERATURE: 98.8 F | DIASTOLIC BLOOD PRESSURE: 68 MMHG | WEIGHT: 158 LBS | OXYGEN SATURATION: 97 %

## 2024-06-05 DIAGNOSIS — B37.0 ORAL THRUSH: Primary | ICD-10-CM

## 2024-06-05 DIAGNOSIS — T36.95XA ANTIBIOTIC-INDUCED YEAST INFECTION: ICD-10-CM

## 2024-06-05 DIAGNOSIS — B37.9 ANTIBIOTIC-INDUCED YEAST INFECTION: ICD-10-CM

## 2024-06-05 PROCEDURE — 99213 OFFICE O/P EST LOW 20 MIN: CPT | Performed by: EMERGENCY MEDICINE

## 2024-06-05 RX ORDER — TERBINAFINE HYDROCHLORIDE 250 MG/1
250 TABLET ORAL DAILY
Qty: 14 TABLET | Refills: 0 | Status: SHIPPED | OUTPATIENT
Start: 2024-06-05

## 2024-06-05 ASSESSMENT — ENCOUNTER SYMPTOMS
WHEEZING: 0
COUGH: 0
EYE REDNESS: 0
SHORTNESS OF BREATH: 0
EYE PAIN: 0
VOMITING: 0
ABDOMINAL DISTENTION: 0
SINUS PRESSURE: 0
BACK PAIN: 0
DIARRHEA: 0
EYE DISCHARGE: 0
NAUSEA: 0
SORE THROAT: 0

## 2024-06-05 NOTE — PROGRESS NOTES
terbinafine (LAMISIL) 250 MG tablet; Take 1 tablet by mouth daily         Discussed symptomatic treatments with the patient today.   Return if symptoms worsen or fail to improve.   Red flag symptoms were also discussed with the patient today. If symptoms worsen the patient is to go directly to the emergency department for reevaluation and treatment. Pt verbalizes understanding and is in agreement with plan of care. All questions answered.    Pt encouraged to speak with PCP regarding pt request to get ENT consult for recurrent strept throat.   New Medications     New Prescriptions    NYSTATIN (MYCOSTATIN) 879116 UNIT/ML SUSPENSION    Take 5 mLs by mouth 4 times daily    TERBINAFINE (LAMISIL) 250 MG TABLET    Take 1 tablet by mouth daily       Electronically signed by Gera Waller DO   DD: 6/5/24

## 2024-06-06 ENCOUNTER — OFFICE VISIT (OUTPATIENT)
Dept: FAMILY MEDICINE CLINIC | Age: 39
End: 2024-06-06
Payer: OTHER GOVERNMENT

## 2024-06-06 VITALS
RESPIRATION RATE: 18 BRPM | BODY MASS INDEX: 26.98 KG/M2 | WEIGHT: 158 LBS | OXYGEN SATURATION: 99 % | HEART RATE: 114 BPM | HEIGHT: 64 IN | DIASTOLIC BLOOD PRESSURE: 60 MMHG | TEMPERATURE: 97.8 F | SYSTOLIC BLOOD PRESSURE: 104 MMHG

## 2024-06-06 DIAGNOSIS — K05.10 GINGIVOSTOMATITIS: ICD-10-CM

## 2024-06-06 DIAGNOSIS — J02.0 STREP THROAT: Primary | ICD-10-CM

## 2024-06-06 DIAGNOSIS — J02.9 SORE THROAT: ICD-10-CM

## 2024-06-06 LAB — S PYO AG THROAT QL: POSITIVE

## 2024-06-06 PROCEDURE — 87880 STREP A ASSAY W/OPTIC: CPT | Performed by: NURSE PRACTITIONER

## 2024-06-06 PROCEDURE — 99213 OFFICE O/P EST LOW 20 MIN: CPT | Performed by: NURSE PRACTITIONER

## 2024-06-06 RX ORDER — CLINDAMYCIN HYDROCHLORIDE 300 MG/1
300 CAPSULE ORAL 3 TIMES DAILY
Qty: 30 CAPSULE | Refills: 0 | Status: SHIPPED | OUTPATIENT
Start: 2024-06-06 | End: 2024-06-16

## 2024-06-06 RX ORDER — VALACYCLOVIR HYDROCHLORIDE 1 G/1
1000 TABLET, FILM COATED ORAL 2 TIMES DAILY
Qty: 20 TABLET | Refills: 0 | Status: SHIPPED | OUTPATIENT
Start: 2024-06-06 | End: 2024-06-16

## 2024-06-06 NOTE — PROGRESS NOTES
Psychiatric:         Mood and Affect: Mood normal.         Behavior: Behavior normal.          Testing:   (All laboratory and radiology results have been personally reviewed by myself)  Labs:  Results for orders placed or performed in visit on 06/06/24   POCT rapid strep A   Result Value Ref Range    Strep A Ag Positive (A) None Detected       Imaging:  All Radiology results interpreted by Radiologist unless otherwise noted.  No orders to display       Assessment / Plan:   The patient's vitals, allergies, medications, and past medical history have been reviewed.  Clarice was seen today for oral swelling.    Diagnoses and all orders for this visit:    Strep throat  -     clindamycin (CLEOCIN) 300 MG capsule; Take 1 capsule by mouth 3 times daily for 10 days    Gingivostomatitis  -     valACYclovir (VALTREX) 1 g tablet; Take 1 tablet by mouth 2 times daily for 10 days    Sore throat  -     POCT rapid strep A  -     benzocaine-menthol (CEPACOL SORE THROAT) 15-3.6 MG lozenge; Take 1 lozenge by mouth every 4 hours as needed for Sore Throat        - Disposition: Home    - Educational material printed for patient's review and were included in patient instructions. After Visit Summary and given to patient at the end of visit.    - Discussed symptomatic treatments with the patient today. The patient is to follow-up with PCP in the next 2-3 days for reevaluation. Red flag symptoms were also discussed with the patient today. If symptoms worsen the patient is to go directly to the emergency department for reevaluation and treatment. Pt verbalizes understanding and is in agreement with plan of care. All questions answered.    SIGNATURE: TORI Herrera-CNP    *NOTE: This report was transcribed using voice recognition software. Every effort was made to ensure accuracy; however, inadvertent computerized transcription errors may be present.

## 2024-06-13 ENCOUNTER — OFFICE VISIT (OUTPATIENT)
Dept: PRIMARY CARE CLINIC | Age: 39
End: 2024-06-13
Payer: OTHER GOVERNMENT

## 2024-06-13 VITALS
BODY MASS INDEX: 25.95 KG/M2 | OXYGEN SATURATION: 100 % | HEART RATE: 85 BPM | DIASTOLIC BLOOD PRESSURE: 64 MMHG | TEMPERATURE: 97.9 F | HEIGHT: 64 IN | WEIGHT: 152 LBS | SYSTOLIC BLOOD PRESSURE: 102 MMHG

## 2024-06-13 DIAGNOSIS — M54.42 CHRONIC BILATERAL LOW BACK PAIN WITH BILATERAL SCIATICA: ICD-10-CM

## 2024-06-13 DIAGNOSIS — M79.7 FIBROMYALGIA: ICD-10-CM

## 2024-06-13 DIAGNOSIS — G89.29 CHRONIC BILATERAL LOW BACK PAIN WITH BILATERAL SCIATICA: ICD-10-CM

## 2024-06-13 DIAGNOSIS — R56.9 SEIZURES (HCC): ICD-10-CM

## 2024-06-13 DIAGNOSIS — K12.1 ORAL ULCERATION: ICD-10-CM

## 2024-06-13 DIAGNOSIS — R30.0 DYSURIA: ICD-10-CM

## 2024-06-13 DIAGNOSIS — J02.0 RECURRENT STREPTOCOCCAL PHARYNGITIS: ICD-10-CM

## 2024-06-13 DIAGNOSIS — M54.41 CHRONIC BILATERAL LOW BACK PAIN WITH BILATERAL SCIATICA: ICD-10-CM

## 2024-06-13 DIAGNOSIS — G62.9 NEUROPATHY: ICD-10-CM

## 2024-06-13 DIAGNOSIS — G47.00 INSOMNIA, UNSPECIFIED TYPE: Primary | ICD-10-CM

## 2024-06-13 DIAGNOSIS — E03.9 ACQUIRED HYPOTHYROIDISM: ICD-10-CM

## 2024-06-13 DIAGNOSIS — K21.9 GASTROESOPHAGEAL REFLUX DISEASE WITHOUT ESOPHAGITIS: ICD-10-CM

## 2024-06-13 DIAGNOSIS — F31.9 BIPOLAR 1 DISORDER (HCC): ICD-10-CM

## 2024-06-13 DIAGNOSIS — F41.9 ANXIETY: ICD-10-CM

## 2024-06-13 LAB
BILIRUBIN, POC: NORMAL
BLOOD URINE, POC: NEGATIVE
CLARITY, POC: CLEAR
COLOR, POC: NORMAL
GLUCOSE URINE, POC: NEGATIVE
KETONES, POC: NORMAL
LEUKOCYTE EST, POC: NEGATIVE
NITRITE, POC: NEGATIVE
PH, POC: 6
PROTEIN, POC: 100
SPECIFIC GRAVITY, POC: >=1.03
UROBILINOGEN, POC: 0.2

## 2024-06-13 PROCEDURE — 99214 OFFICE O/P EST MOD 30 MIN: CPT | Performed by: NURSE PRACTITIONER

## 2024-06-13 PROCEDURE — 81002 URINALYSIS NONAUTO W/O SCOPE: CPT | Performed by: NURSE PRACTITIONER

## 2024-06-13 RX ORDER — CARISOPRODOL 350 MG/1
350 TABLET ORAL DAILY
Qty: 30 TABLET | Refills: 2 | Status: SHIPPED | OUTPATIENT
Start: 2024-06-13 | End: 2024-09-11

## 2024-06-13 NOTE — PROGRESS NOTES
ESTABLISHED PRIMARY CARE VISIT  24  Name: Clarice Rehman   : 1985   Age: 39 y.o.  Sex: female    Subjective:     Chief Complaint   Patient presents with    Insomnia     Clarice Rehman presents to the office for follow up on chronic problems.  Issues that were addressed today include:  She has a pertinent past medical history for epilepsy, anxiety, chronic low back pain, seizures, recurrent UTi's and fibromyalgia.   Seizures-she does follow with neurology at [facility].  MRI of the brain was unremarkable.  SUZAN positive.  EEG revealed frequent spikes.  She is currently taking Keppra and topamax. No recent seizures.    Anxiety/depression- Controlled.  Follows with Laura Robles NP.  She is taking her medications as prescribed. [facility] and [facility]  Fibromyalgia was diagnosed in .  She is currently taking Lyrica which manages her symptoms.  She did have a spinal fusion in .    Recurrent strep with complicated thrush/vaginal yeast infections- X 6 episodes in the past 8 months. Currently on clindamycin/ Lamisil.   Review of Systems   Constitutional:  Positive for fatigue. Negative for activity change, appetite change, fever and unexpected weight change.   HENT:  Positive for mouth sores and sore throat (recurrent strep X 6). Negative for congestion, facial swelling, postnasal drip, rhinorrhea, sneezing, tinnitus and voice change.    Eyes: Negative.    Respiratory:  Negative for apnea, cough, chest tightness, shortness of breath and wheezing.    Cardiovascular:  Negative for chest pain, palpitations and leg swelling.   Gastrointestinal:  Negative for abdominal distention, abdominal pain, constipation, diarrhea, nausea and vomiting.   Endocrine: Negative for cold intolerance and heat intolerance.   Genitourinary:  Positive for vaginal discharge and vaginal pain (vaginal ulcerations). Negative for difficulty urinating, dysuria, flank pain, frequency,

## 2024-06-14 ENCOUNTER — OFFICE VISIT (OUTPATIENT)
Dept: ENT CLINIC | Age: 39
End: 2024-06-14
Payer: OTHER GOVERNMENT

## 2024-06-14 VITALS
DIASTOLIC BLOOD PRESSURE: 66 MMHG | BODY MASS INDEX: 26.29 KG/M2 | TEMPERATURE: 97.2 F | SYSTOLIC BLOOD PRESSURE: 104 MMHG | WEIGHT: 154 LBS | HEART RATE: 103 BPM | HEIGHT: 64 IN

## 2024-06-14 DIAGNOSIS — J35.1 TONSILLAR HYPERTROPHY: Primary | ICD-10-CM

## 2024-06-14 DIAGNOSIS — J03.01 RECURRENT STREPTOCOCCAL TONSILLITIS: ICD-10-CM

## 2024-06-14 PROBLEM — J02.0 RECURRENT STREPTOCOCCAL PHARYNGITIS: Status: ACTIVE | Noted: 2024-06-14

## 2024-06-14 PROBLEM — K12.1 ORAL ULCERATION: Status: ACTIVE | Noted: 2024-06-14

## 2024-06-14 PROCEDURE — 99204 OFFICE O/P NEW MOD 45 MIN: CPT

## 2024-06-14 ASSESSMENT — ENCOUNTER SYMPTOMS
CHEST TIGHTNESS: 0
CONSTIPATION: 0
SHORTNESS OF BREATH: 0
DIARRHEA: 0
BACK PAIN: 0
COLOR CHANGE: 0
RHINORRHEA: 0
RHINORRHEA: 0
VOMITING: 0
ABDOMINAL PAIN: 0
COUGH: 0
EYES NEGATIVE: 1
DIARRHEA: 0
SINUS PRESSURE: 0
SHORTNESS OF BREATH: 0
FACIAL SWELLING: 0
WHEEZING: 0
COUGH: 0
SORE THROAT: 1
SORE THROAT: 1
VOICE CHANGE: 0
ALLERGIC/IMMUNOLOGIC NEGATIVE: 1
EYE DISCHARGE: 0
APNEA: 0
EYE PAIN: 0
BACK PAIN: 0
VOMITING: 0
NAUSEA: 0
ABDOMINAL DISTENTION: 0

## 2024-06-14 NOTE — ASSESSMENT & PLAN NOTE
Secondary to repeat antibiotic use.  She does have a history of recurrent thrush/vaginal ulcerations.  She did present to the ER who felt that this is most likely herpetic in nature and was placed on acyclovir, she has been taking this medication and symptoms have slowly been improving.  They did send off lab work for HSV, chlamydia and gonorrhea, no results available at this time.  Will request records.

## 2024-06-14 NOTE — PROGRESS NOTES
Subjective:      Patient ID:  Clarice Rehman is a 39 y.o. female.    HPI:  Chronic Tonsillitis  Patient presents with recurrent tonsillitis. The patient reports fever, sore throats, streptococcal pharyngitis, purulent debris in throat, bad breath. The symptoms have been present for 8months.  She has had 6 episodes of streptococcal pharyngitis per year(s) for the past 8 months. She has not missed excessive amounts of school/work this year due to illness. There has not been a history of chronic ear infections. Last tonsil infection was  1 week ago.    Patient sates she gets abx for the strep,which leads to thrush and yeast infections  This is complicated by the fact that the patient is allergic to diflucan and cannot take medication  Prior to the past 8 months she had infrequent strep but has been almost persistent over the past 8 months.     Past Medical History:   Diagnosis Date    Epilepsy (HCC)     Thyroid disease      Past Surgical History:   Procedure Laterality Date    BACK SURGERY      ENDOMETRIAL ABLATION  06/07/2023    FOOT SURGERY Left 07/02/2014    i and d with biopsy and removal of foreign body    HYSTERECTOMY (CERVIX STATUS UNKNOWN)       History reviewed. No pertinent family history.  Social History     Socioeconomic History    Marital status:      Spouse name: None    Number of children: None    Years of education: None    Highest education level: None   Tobacco Use    Smoking status: Every Day     Current packs/day: 0.50     Types: Cigarettes    Smokeless tobacco: Never   Vaping Use    Vaping Use: Every day    Substances: Nicotine    Devices: Pre-filled or refillable cartridge, Refillable tank   Substance and Sexual Activity    Alcohol use: Yes     Comment: occasionally    Drug use: Never     Social Determinants of Health     Financial Resource Strain: Low Risk  (3/12/2024)    Overall Financial Resource Strain (CARDIA)     Difficulty of Paying Living Expenses: Not hard at all   Food

## 2024-06-14 NOTE — ASSESSMENT & PLAN NOTE
Continue clindamycin in its entirety.  Referral placed to ENT.  Patient does get recurrent vaginal yeast/thrush infections with antibiotic use.  She has been on nystatin swish and spit, Diflucan with no relief of symptoms.  Currently taking Lamisil which is slowly improving symptoms.  Encourage patient to take probiotic OTC and increase yogurt consumption.  She is to schedule a dental exam.

## 2024-06-14 NOTE — ASSESSMENT & PLAN NOTE
controlled follows with the specialist.  Continue Lyrica.  This is prescribed by an outside provider.

## 2024-06-14 NOTE — PATIENT INSTRUCTIONS
Thank you for choosing our [facility]. We are committed to your medical treatment and  care. If you need to reschedule or cancel your surgery or follow up  appointment, please call the surgery scheduler at [phone].    INSTRUCTIONS FOR SURGERY T&A    Nothing to eat or drink after midnight the night before surgery unless surgery is at [facility] or otherwise instructed by the hospital.    DO NOT TAKE ANY ASPIRIN PRODUCTS 7 days prior to surgery-unless required by your cardiologist or primary care physician. Tylenol only. No Advil, Motrin, Aleve, or Ibuprofen    Any illegal drugs in your system (including Marijuana even if legally prescribed) will result in your surgery being cancelled. Please be sure to check with our office or the hospital on time frame for the drugs to be out of your system.    Should your insurance change at any time you must contact our office. Failure to do so may result in your surgery being rescheduled.    If you need paperwork filled out for work, you must give the office 2 weeks to complete and submit the forms.      [facility], [address] [place] [address] will call you a couple days prior to your surgery and give you further instructions, if any questions call them at [phone]

## 2024-06-15 LAB
CULTURE: NORMAL
SPECIMEN DESCRIPTION: NORMAL

## 2024-06-17 ENCOUNTER — TELEPHONE (OUTPATIENT)
Dept: ENT CLINIC | Age: 39
End: 2024-06-17

## 2024-06-17 ENCOUNTER — TELEPHONE (OUTPATIENT)
Dept: PRIMARY CARE CLINIC | Age: 39
End: 2024-06-17

## 2024-06-17 ENCOUNTER — PREP FOR PROCEDURE (OUTPATIENT)
Dept: ENT CLINIC | Age: 39
End: 2024-06-17

## 2024-06-17 DIAGNOSIS — J35.01 CHRONIC TONSILLITIS: ICD-10-CM

## 2024-06-17 NOTE — TELEPHONE ENCOUNTER
Prior Authorization Form:      DEMOGRAPHICS:                     Patient Name:  Porsche Rehman  Patient :  1985            Insurance:  Payor:  EAST / Plan:  EAST SELECT / Product Type: *No Product type* /   Insurance ID Number:    Payer/Plan Subscr  Sex Relation Sub. Ins. ID Effective Group Num   1. [facility] * PORSCHE REHMAN 1985 Female Self [id number]                                    [address]         DIAGNOSIS & PROCEDURE:                       Procedure/Operation: Tonsillectomy            CPT Code: 74123    Diagnosis:  chronic tonsillitis     ICD10 Code: J35.01    Location:  [facility]    Surgeon:  meliton    SCHEDULING INFORMATION:                          Date: 24    Time: n/a              Anesthesia:  General                                                       Status:  Outpatient        Special Comments:  include all chart notes       Electronically signed by Irwin Hobbs MA on 2024 at 1:43 PM

## 2024-06-17 NOTE — TELEPHONE ENCOUNTER
Patient called in asking for a cream for vaginal herpes. She states that she had seen Funmilayo on 06/13 and had gone to the ED on 06/14. I asked her if she wanted something to be used for the pain and she said yes, something to help with the discomfort. I did advise that Funmilayo was on vacation this week, but I would see if a covering provider would be willing to prescribe.

## 2024-06-19 ENCOUNTER — OFFICE VISIT (OUTPATIENT)
Dept: FAMILY MEDICINE CLINIC | Age: 39
End: 2024-06-19
Payer: OTHER GOVERNMENT

## 2024-06-19 VITALS
BODY MASS INDEX: 26.29 KG/M2 | OXYGEN SATURATION: 98 % | DIASTOLIC BLOOD PRESSURE: 62 MMHG | RESPIRATION RATE: 18 BRPM | TEMPERATURE: 98.2 F | WEIGHT: 154 LBS | HEIGHT: 64 IN | HEART RATE: 110 BPM | SYSTOLIC BLOOD PRESSURE: 106 MMHG

## 2024-06-19 DIAGNOSIS — J02.9 PHARYNGITIS, UNSPECIFIED ETIOLOGY: ICD-10-CM

## 2024-06-19 DIAGNOSIS — N89.8 VAGINAL IRRITATION: Primary | ICD-10-CM

## 2024-06-19 PROBLEM — N76.6 ULCERATION OF VULVA: Status: ACTIVE | Noted: 2024-06-14

## 2024-06-19 PROBLEM — M54.9 CHRONIC BACK PAIN: Status: ACTIVE | Noted: 2022-09-27

## 2024-06-19 PROCEDURE — 99214 OFFICE O/P EST MOD 30 MIN: CPT

## 2024-06-19 RX ORDER — METHYLPREDNISOLONE 4 MG/1
TABLET ORAL
Qty: 1 KIT | Refills: 0 | Status: SHIPPED | OUTPATIENT
Start: 2024-06-19

## 2024-06-19 RX ORDER — CLOTRIMAZOLE 1 %
CREAM WITH APPLICATOR VAGINAL
Qty: 45 G | Refills: 0 | Status: SHIPPED | OUTPATIENT
Start: 2024-06-19

## 2024-06-19 NOTE — TELEPHONE ENCOUNTER
Pt called back in in regards to this. She is upset and feels like the ball has been dropped because no one got back with her on this. She was aware marquis is out of the office. I did tell her that we have not seen her for this issue and technically the er should have addressed her concerns. I read her er note and asked how her throat was feeling and she said her antibiotic is complete and she still has a sore throat. I did advise her to come through the walk in so someone can re evaluate her throat and treat the herpes. It sounds like the patient has multiple complaints at this time

## 2024-06-19 NOTE — PROGRESS NOTES
visit:    Vaginal irritation  -     clotrimazole (LOTRIMIN) 1 % vaginal cream; Place vaginally 2 times daily for 7 days.    Pharyngitis, unspecified etiology  -     methylPREDNISolone (MEDROL DOSEPACK) 4 MG tablet; Take by mouth.        Patient prefers not to start antibiotics again because of his ongoing cycle between fungal infections and antibiotic usage.  Will treat today with Medrol Dosepak for pharyngitis and patient will follow-up with PCP after completion if still symptomatic.  Patient will be treated with  clotrimazole for vaginal irritation.  Reports that she just cannot take systemic antifungals.  She will increase fluids and rest. NSAIDs/Tylenol prn pain/fever. F/u PCP in 5-7 days if symptoms persist. ED sooner if symptoms worsen or change. ED immediately with the development of refractory fever, shaking chills, dyspnea, dysphagia, neck stiffness, vomiting, etc. Pt is in agreement with this care plan. All questions answered.    Multiple office notes were reviewed as discussed above.    No follow-ups on file.    Pinky Carroll, APRN - CNP

## 2024-06-26 ENCOUNTER — OFFICE VISIT (OUTPATIENT)
Dept: FAMILY MEDICINE CLINIC | Age: 39
End: 2024-06-26
Payer: OTHER GOVERNMENT

## 2024-06-26 VITALS
SYSTOLIC BLOOD PRESSURE: 90 MMHG | DIASTOLIC BLOOD PRESSURE: 60 MMHG | RESPIRATION RATE: 18 BRPM | HEIGHT: 64 IN | WEIGHT: 154 LBS | OXYGEN SATURATION: 97 % | TEMPERATURE: 96.9 F | BODY MASS INDEX: 26.29 KG/M2 | HEART RATE: 70 BPM

## 2024-06-26 DIAGNOSIS — R30.0 DYSURIA: ICD-10-CM

## 2024-06-26 DIAGNOSIS — R53.83 OTHER FATIGUE: ICD-10-CM

## 2024-06-26 DIAGNOSIS — B37.0 ORAL THRUSH: ICD-10-CM

## 2024-06-26 DIAGNOSIS — N30.00 ACUTE CYSTITIS WITHOUT HEMATURIA: Primary | ICD-10-CM

## 2024-06-26 LAB
BILIRUBIN, POC: NORMAL
BLOOD URINE, POC: NORMAL
CLARITY, POC: CLEAR
COLOR, POC: YELLOW
CONTROL: NORMAL
GLUCOSE URINE, POC: NORMAL
HETEROPHILE ANTIBODIES: NORMAL
KETONES, POC: NORMAL
LEUKOCYTE EST, POC: NORMAL
NITRITE, POC: NORMAL
PH, POC: 6
PREGNANCY TEST URINE, POC: NORMAL
PROTEIN, POC: NORMAL
SPECIFIC GRAVITY, POC: 1.02
UROBILINOGEN, POC: NORMAL

## 2024-06-26 PROCEDURE — 81025 URINE PREGNANCY TEST: CPT | Performed by: EMERGENCY MEDICINE

## 2024-06-26 PROCEDURE — 81002 URINALYSIS NONAUTO W/O SCOPE: CPT | Performed by: EMERGENCY MEDICINE

## 2024-06-26 PROCEDURE — 86308 HETEROPHILE ANTIBODY SCREEN: CPT | Performed by: EMERGENCY MEDICINE

## 2024-06-26 PROCEDURE — 99214 OFFICE O/P EST MOD 30 MIN: CPT | Performed by: EMERGENCY MEDICINE

## 2024-06-26 RX ORDER — NITROFURANTOIN 25; 75 MG/1; MG/1
100 CAPSULE ORAL 2 TIMES DAILY
Qty: 10 CAPSULE | Refills: 0 | Status: SHIPPED | OUTPATIENT
Start: 2024-06-26 | End: 2024-07-01

## 2024-06-26 ASSESSMENT — ENCOUNTER SYMPTOMS
BACK PAIN: 0
EYE REDNESS: 0
COUGH: 0
EYE PAIN: 0
EYE DISCHARGE: 0
NAUSEA: 0
DIARRHEA: 0
SINUS PRESSURE: 0
SHORTNESS OF BREATH: 0
WHEEZING: 0
VOMITING: 0
ABDOMINAL DISTENTION: 0
SORE THROAT: 0

## 2024-06-26 NOTE — PROGRESS NOTES
Normal.    Physical Exam  Vitals and nursing note reviewed.   Constitutional:       Appearance: She is well-developed.   HENT:      Head: Normocephalic and atraumatic.      Right Ear: Hearing and external ear normal.      Left Ear: Hearing, tympanic membrane and external ear normal.      Nose: Nose normal.      Mouth/Throat:      Dentition: Gum lesions present.      Tongue: Lesions present.      Palate: Lesions present.      Pharynx: Uvula midline. Posterior oropharyngeal erythema present.   Eyes:      General: Lids are normal.      Conjunctiva/sclera: Conjunctivae normal.      Pupils: Pupils are equal, round, and reactive to light.   Cardiovascular:      Rate and Rhythm: Normal rate and regular rhythm.      Heart sounds: Normal heart sounds. No murmur heard.  Pulmonary:      Effort: Pulmonary effort is normal.      Breath sounds: Normal breath sounds.   Abdominal:      General: Bowel sounds are normal.      Palpations: Abdomen is soft. Abdomen is not rigid.      Tenderness: There is no abdominal tenderness. There is no guarding or rebound.   Musculoskeletal:      Cervical back: Normal range of motion and neck supple.   Skin:     General: Skin is warm and dry.      Findings: No abrasion or rash.   Neurological:      Mental Status: She is alert and oriented to person, place, and time.      GCS: GCS eye subscore is 4. GCS verbal subscore is 5. GCS motor subscore is 6.      Cranial Nerves: No cranial nerve deficit.      Sensory: No sensory deficit.      Coordination: Coordination normal.      Gait: Gait normal.         Test Results Section   (All laboratory and radiology results have been personally reviewed by myself)  Labs:  Results for orders placed or performed in visit on 06/26/24   POCT Urinalysis no Micro   Result Value Ref Range    Color, UA yellow     Clarity, UA clear     Glucose, UA POC neg     Bilirubin, UA neg     Ketones, UA neg     Spec Grav, UA 1.025     Blood, UA POC neg     pH, UA 6.0     Protein, UA

## 2024-06-30 LAB
CULTURE: ABNORMAL
SPECIMEN DESCRIPTION: ABNORMAL

## 2024-07-12 NOTE — PROGRESS NOTES
[facility] PRE-ADMISSION TESTING INSTRUCTIONS    The Preadmission Testing patient is instructed accordingly using the following criteria (check applicable):    ARRIVAL INSTRUCTIONS:  [x] Parking the day of Surgery is located in the Main Entrance lot.  Upon entering the door, make an immediate right to the surgery reception desk    [x] Bring photo ID and insurance card    [x] Bring in a copy of Living will or Durable Power of  papers.    [x] Please be sure to arrange for a responsible adult to provide transportation to and from the hospital    [x] Please arrange for a responsible adult to be with you for the 24 hour period post procedure due to having anesthesia    [x] If you awake am of surgery not feeling well or have temperature >100 please call [phone]    GENERAL INSTRUCTIONS:    [x] No solid foods after midnight, may have up to 8oz of water until 4 hours prior to surgery. No gum, no candy, no mints. NPO time: 0900       [x] You may brush your teeth, do not swallow any toothpaste    [x] Take medications as instructed     [x] Stop herbal supplements and vitamins 5 days prior to procedure    [x] Follow preop dosing of blood thinners per physician instructions    [] Take 1/2 dose of evening insulin, but no insulin after midnight    [] No oral diabetic medications after midnight    [] If diabetic and have low blood sugar or feel symptomatic, take 1-2oz apple juice only    [] Bring inhalers day of surgery    [] Bring urine specimen day of surgery    [x] Shower or bath with soap, lather and rinse well, AM of Surgery, no lotion, powders or creams to surgical site    [] Follow bowel prep as instructed per surgeon    [x] No tobacco products within 24 hours of surgery     [x] No alcohol or illegal drug use, marijuana included, within 24 hours of surgery.    [x] Jewelry, body piercing's, eyeglasses, contact lenses and dentures are not permitted into surgery (bring cases)      [x]

## 2024-07-14 NOTE — H&P
Subjective:      Patient ID:  Clarice Rehman is a 39 y.o. female.     HPI:  Chronic Tonsillitis  Patient presents with recurrent tonsillitis. The patient reports fever, sore throats, streptococcal pharyngitis, purulent debris in throat, bad breath. The symptoms have been present for 8months.  She has had 6 episodes of streptococcal pharyngitis per year(s) for the past 8 months. She has not missed excessive amounts of school/work this year due to illness. There has not been a history of chronic ear infections. Last tonsil infection was  1 week ago.     Patient sates she gets abx for the strep,which leads to thrush and yeast infections  This is complicated by the fact that the patient is allergic to diflucan and cannot take medication  Prior to the past 8 months she had infrequent strep but has been almost persistent over the past 8 months.      Past Medical History        Past Medical History:   Diagnosis Date    Epilepsy (HCC)      Thyroid disease           Past Surgical History         Past Surgical History:   Procedure Laterality Date    BACK SURGERY        ENDOMETRIAL ABLATION   06/07/2023    FOOT SURGERY Left 07/02/2014     i and d with biopsy and removal of foreign body    HYSTERECTOMY (CERVIX STATUS UNKNOWN)             Family History   History reviewed. No pertinent family history.     Social History   Social History            Socioeconomic History    Marital status:        Spouse name: None    Number of children: None    Years of education: None    Highest education level: None   Tobacco Use    Smoking status: Every Day       Current packs/day: 0.50       Types: Cigarettes    Smokeless tobacco: Never   Vaping Use    Vaping Use: Every day    Substances: Nicotine    Devices: Pre-filled or refillable cartridge, Refillable tank   Substance and Sexual Activity    Alcohol use: Yes       Comment: occasionally    Drug use: Never      Social Determinants of Health           Financial Resource Strain: Low  infection.  Upon exam 3+ cryptic tonsils were noted bilaterally.  At this time she does meet criteria for surgical intervention.     I recommend:     Tonsillectomy   I will not keep the patient overnight for observation after surgery  The procedure risks and benefits were discussed with the patient and family including:  --Bleeding occurs in 1 to 4% of patients  --Poor speech (hyper nasal speech) occurs in 1/3000 patients.  --Nasopharyngeal Stenosis  --Chipped Teeth  --Electrocautery Mcconnell  --Death  Pt and family understood and decided to proceed with the surgery.  She will be scheduled for surgical intervention with Dr. Rubio at [facility] in [facility].  She will then follow-up 2 weeks postoperatively for repeat evaluation.  She did verbalize understanding and was in agreement to this plan.  She was instructed to call the office for any new or worsening symptoms prior to her next encounter     Follow up 2 weeks after surgery

## 2024-07-15 LAB — MAMMOGRAPHY, EXTERNAL: NORMAL

## 2024-07-16 ENCOUNTER — ANESTHESIA EVENT (OUTPATIENT)
Dept: OPERATING ROOM | Age: 39
End: 2024-07-16
Payer: OTHER GOVERNMENT

## 2024-07-17 ENCOUNTER — ANESTHESIA (OUTPATIENT)
Dept: OPERATING ROOM | Age: 39
End: 2024-07-17
Payer: OTHER GOVERNMENT

## 2024-07-17 ENCOUNTER — HOSPITAL ENCOUNTER (OUTPATIENT)
Age: 39
Setting detail: OUTPATIENT SURGERY
Discharge: HOME | End: 2024-07-17
Attending: OTOLARYNGOLOGY | Admitting: OTOLARYNGOLOGY
Payer: OTHER GOVERNMENT

## 2024-07-17 VITALS
DIASTOLIC BLOOD PRESSURE: 74 MMHG | HEART RATE: 77 BPM | TEMPERATURE: 98.1 F | OXYGEN SATURATION: 100 % | BODY MASS INDEX: 26.29 KG/M2 | SYSTOLIC BLOOD PRESSURE: 121 MMHG | HEIGHT: 64 IN | WEIGHT: 154 LBS | RESPIRATION RATE: 22 BRPM

## 2024-07-17 DIAGNOSIS — J35.01 CHRONIC TONSILLITIS: ICD-10-CM

## 2024-07-17 DIAGNOSIS — G89.18 POST-OP PAIN: Primary | ICD-10-CM

## 2024-07-17 PROCEDURE — 3700000001 HC ADD 15 MINUTES (ANESTHESIA): Performed by: OTOLARYNGOLOGY

## 2024-07-17 PROCEDURE — 2709999900 HC NON-CHARGEABLE SUPPLY: Performed by: OTOLARYNGOLOGY

## 2024-07-17 PROCEDURE — 2500000003 HC RX 250 WO HCPCS: Performed by: NURSE ANESTHETIST, CERTIFIED REGISTERED

## 2024-07-17 PROCEDURE — 7100000011 HC PHASE II RECOVERY - ADDTL 15 MIN: Performed by: OTOLARYNGOLOGY

## 2024-07-17 PROCEDURE — 3600000002 HC SURGERY LEVEL 2 BASE: Performed by: OTOLARYNGOLOGY

## 2024-07-17 PROCEDURE — 2580000003 HC RX 258: Performed by: NURSE ANESTHETIST, CERTIFIED REGISTERED

## 2024-07-17 PROCEDURE — 6360000002 HC RX W HCPCS: Performed by: NURSE ANESTHETIST, CERTIFIED REGISTERED

## 2024-07-17 PROCEDURE — 7100000010 HC PHASE II RECOVERY - FIRST 15 MIN: Performed by: OTOLARYNGOLOGY

## 2024-07-17 PROCEDURE — 3600000012 HC SURGERY LEVEL 2 ADDTL 15MIN: Performed by: OTOLARYNGOLOGY

## 2024-07-17 PROCEDURE — 2720000010 HC SURG SUPPLY STERILE: Performed by: OTOLARYNGOLOGY

## 2024-07-17 PROCEDURE — 3700000000 HC ANESTHESIA ATTENDED CARE: Performed by: OTOLARYNGOLOGY

## 2024-07-17 PROCEDURE — 7100000000 HC PACU RECOVERY - FIRST 15 MIN: Performed by: OTOLARYNGOLOGY

## 2024-07-17 PROCEDURE — 6360000002 HC RX W HCPCS: Performed by: ANESTHESIOLOGY

## 2024-07-17 PROCEDURE — 42826 REMOVAL OF TONSILS: CPT | Performed by: OTOLARYNGOLOGY

## 2024-07-17 PROCEDURE — 7100000001 HC PACU RECOVERY - ADDTL 15 MIN: Performed by: OTOLARYNGOLOGY

## 2024-07-17 PROCEDURE — 6370000000 HC RX 637 (ALT 250 FOR IP): Performed by: OTOLARYNGOLOGY

## 2024-07-17 PROCEDURE — 6370000000 HC RX 637 (ALT 250 FOR IP)

## 2024-07-17 PROCEDURE — 88304 TISSUE EXAM BY PATHOLOGIST: CPT

## 2024-07-17 RX ORDER — FERRIC SUBSULFATE 0.21 G/G
LIQUID TOPICAL PRN
Status: DISCONTINUED | OUTPATIENT
Start: 2024-07-17 | End: 2024-07-17 | Stop reason: ALTCHOICE

## 2024-07-17 RX ORDER — SODIUM CHLORIDE 0.9 % (FLUSH) 0.9 %
5-40 SYRINGE (ML) INJECTION EVERY 12 HOURS SCHEDULED
Status: DISCONTINUED | OUTPATIENT
Start: 2024-07-17 | End: 2024-07-17 | Stop reason: HOSPADM

## 2024-07-17 RX ORDER — FENTANYL CITRATE 50 UG/ML
INJECTION, SOLUTION INTRAMUSCULAR; INTRAVENOUS PRN
Status: DISCONTINUED | OUTPATIENT
Start: 2024-07-17 | End: 2024-07-17 | Stop reason: SDUPTHER

## 2024-07-17 RX ORDER — LIDOCAINE HYDROCHLORIDE 20 MG/ML
INJECTION, SOLUTION EPIDURAL; INFILTRATION; INTRACAUDAL; PERINEURAL PRN
Status: DISCONTINUED | OUTPATIENT
Start: 2024-07-17 | End: 2024-07-17 | Stop reason: SDUPTHER

## 2024-07-17 RX ORDER — TRAMADOL HYDROCHLORIDE 50 MG/1
50 TABLET ORAL EVERY 6 HOURS PRN
Qty: 28 TABLET | Refills: 0 | Status: SHIPPED | OUTPATIENT
Start: 2024-07-17 | End: 2024-07-24

## 2024-07-17 RX ORDER — HYDRALAZINE HYDROCHLORIDE 20 MG/ML
10 INJECTION INTRAMUSCULAR; INTRAVENOUS
Status: DISCONTINUED | OUTPATIENT
Start: 2024-07-17 | End: 2024-07-17 | Stop reason: HOSPADM

## 2024-07-17 RX ORDER — MEPERIDINE HYDROCHLORIDE 25 MG/ML
12.5 INJECTION INTRAMUSCULAR; INTRAVENOUS; SUBCUTANEOUS EVERY 5 MIN PRN
Status: DISCONTINUED | OUTPATIENT
Start: 2024-07-17 | End: 2024-07-17 | Stop reason: HOSPADM

## 2024-07-17 RX ORDER — IPRATROPIUM BROMIDE AND ALBUTEROL SULFATE 2.5; .5 MG/3ML; MG/3ML
1 SOLUTION RESPIRATORY (INHALATION)
Status: DISCONTINUED | OUTPATIENT
Start: 2024-07-17 | End: 2024-07-17 | Stop reason: HOSPADM

## 2024-07-17 RX ORDER — DEXMEDETOMIDINE HYDROCHLORIDE 100 UG/ML
INJECTION, SOLUTION INTRAVENOUS PRN
Status: DISCONTINUED | OUTPATIENT
Start: 2024-07-17 | End: 2024-07-17 | Stop reason: SDUPTHER

## 2024-07-17 RX ORDER — ONDANSETRON 2 MG/ML
4 INJECTION INTRAMUSCULAR; INTRAVENOUS
Status: COMPLETED | OUTPATIENT
Start: 2024-07-17 | End: 2024-07-17

## 2024-07-17 RX ORDER — PREDNISONE 10 MG/1
40 TABLET ORAL DAILY
Qty: 12 TABLET | Refills: 0 | Status: SHIPPED | OUTPATIENT
Start: 2024-07-17 | End: 2024-07-20

## 2024-07-17 RX ORDER — PREDNISONE 10 MG/1
40 TABLET ORAL DAILY
Qty: 12 TABLET | Refills: 0 | Status: SHIPPED | OUTPATIENT
Start: 2024-07-17 | End: 2024-07-17

## 2024-07-17 RX ORDER — SODIUM CHLORIDE 9 MG/ML
INJECTION, SOLUTION INTRAVENOUS PRN
Status: DISCONTINUED | OUTPATIENT
Start: 2024-07-17 | End: 2024-07-17 | Stop reason: HOSPADM

## 2024-07-17 RX ORDER — DEXAMETHASONE SODIUM PHOSPHATE 4 MG/ML
INJECTION, SOLUTION INTRA-ARTICULAR; INTRALESIONAL; INTRAMUSCULAR; INTRAVENOUS; SOFT TISSUE PRN
Status: DISCONTINUED | OUTPATIENT
Start: 2024-07-17 | End: 2024-07-17 | Stop reason: SDUPTHER

## 2024-07-17 RX ORDER — ONDANSETRON 4 MG/1
4 TABLET, FILM COATED ORAL EVERY 6 HOURS
Qty: 28 TABLET | Refills: 0 | Status: SHIPPED | OUTPATIENT
Start: 2024-07-17 | End: 2024-07-24

## 2024-07-17 RX ORDER — TOPIRAMATE 100 MG/1
200 TABLET, FILM COATED ORAL NIGHTLY
COMMUNITY

## 2024-07-17 RX ORDER — LABETALOL HYDROCHLORIDE 5 MG/ML
10 INJECTION, SOLUTION INTRAVENOUS
Status: DISCONTINUED | OUTPATIENT
Start: 2024-07-17 | End: 2024-07-17 | Stop reason: HOSPADM

## 2024-07-17 RX ORDER — OXYCODONE HYDROCHLORIDE 5 MG/1
TABLET ORAL
Status: COMPLETED
Start: 2024-07-17 | End: 2024-07-17

## 2024-07-17 RX ORDER — GLYCOPYRROLATE 0.2 MG/ML
INJECTION INTRAMUSCULAR; INTRAVENOUS PRN
Status: DISCONTINUED | OUTPATIENT
Start: 2024-07-17 | End: 2024-07-17 | Stop reason: SDUPTHER

## 2024-07-17 RX ORDER — NALOXONE HYDROCHLORIDE 0.4 MG/ML
INJECTION, SOLUTION INTRAMUSCULAR; INTRAVENOUS; SUBCUTANEOUS PRN
Status: DISCONTINUED | OUTPATIENT
Start: 2024-07-17 | End: 2024-07-17 | Stop reason: HOSPADM

## 2024-07-17 RX ORDER — MIDAZOLAM HYDROCHLORIDE 2 MG/2ML
2 INJECTION, SOLUTION INTRAMUSCULAR; INTRAVENOUS
Status: DISCONTINUED | OUTPATIENT
Start: 2024-07-17 | End: 2024-07-17 | Stop reason: HOSPADM

## 2024-07-17 RX ORDER — SODIUM CHLORIDE, SODIUM LACTATE, POTASSIUM CHLORIDE, CALCIUM CHLORIDE 600; 310; 30; 20 MG/100ML; MG/100ML; MG/100ML; MG/100ML
INJECTION, SOLUTION INTRAVENOUS CONTINUOUS
Status: DISCONTINUED | OUTPATIENT
Start: 2024-07-17 | End: 2024-07-17 | Stop reason: HOSPADM

## 2024-07-17 RX ORDER — SODIUM CHLORIDE 0.9 % (FLUSH) 0.9 %
5-40 SYRINGE (ML) INJECTION PRN
Status: DISCONTINUED | OUTPATIENT
Start: 2024-07-17 | End: 2024-07-17 | Stop reason: HOSPADM

## 2024-07-17 RX ORDER — DIPHENHYDRAMINE HYDROCHLORIDE 50 MG/ML
INJECTION INTRAMUSCULAR; INTRAVENOUS PRN
Status: DISCONTINUED | OUTPATIENT
Start: 2024-07-17 | End: 2024-07-17 | Stop reason: SDUPTHER

## 2024-07-17 RX ORDER — OXYCODONE HYDROCHLORIDE 5 MG/1
5 TABLET ORAL
Status: COMPLETED | OUTPATIENT
Start: 2024-07-17 | End: 2024-07-17

## 2024-07-17 RX ORDER — SODIUM CHLORIDE 9 MG/ML
INJECTION, SOLUTION INTRAVENOUS CONTINUOUS PRN
Status: DISCONTINUED | OUTPATIENT
Start: 2024-07-17 | End: 2024-07-17 | Stop reason: SDUPTHER

## 2024-07-17 RX ORDER — MIDAZOLAM HYDROCHLORIDE 1 MG/ML
INJECTION INTRAMUSCULAR; INTRAVENOUS PRN
Status: DISCONTINUED | OUTPATIENT
Start: 2024-07-17 | End: 2024-07-17 | Stop reason: SDUPTHER

## 2024-07-17 RX ORDER — ONDANSETRON 2 MG/ML
INJECTION INTRAMUSCULAR; INTRAVENOUS PRN
Status: DISCONTINUED | OUTPATIENT
Start: 2024-07-17 | End: 2024-07-17 | Stop reason: SDUPTHER

## 2024-07-17 RX ORDER — FENTANYL CITRATE 50 UG/ML
25 INJECTION, SOLUTION INTRAMUSCULAR; INTRAVENOUS EVERY 5 MIN PRN
Status: DISCONTINUED | OUTPATIENT
Start: 2024-07-17 | End: 2024-07-17 | Stop reason: HOSPADM

## 2024-07-17 RX ORDER — PROPOFOL 10 MG/ML
INJECTION, EMULSION INTRAVENOUS PRN
Status: DISCONTINUED | OUTPATIENT
Start: 2024-07-17 | End: 2024-07-17 | Stop reason: SDUPTHER

## 2024-07-17 RX ORDER — ROCURONIUM BROMIDE 10 MG/ML
INJECTION, SOLUTION INTRAVENOUS PRN
Status: DISCONTINUED | OUTPATIENT
Start: 2024-07-17 | End: 2024-07-17 | Stop reason: SDUPTHER

## 2024-07-17 RX ADMIN — OXYCODONE HYDROCHLORIDE 5 MG: 5 TABLET ORAL at 16:32

## 2024-07-17 RX ADMIN — DEXMEDETOMIDINE 20 MCG: 100 INJECTION, SOLUTION INTRAVENOUS at 15:05

## 2024-07-17 RX ADMIN — LIDOCAINE HYDROCHLORIDE 100 MG: 20 INJECTION, SOLUTION EPIDURAL; INFILTRATION; INTRACAUDAL; PERINEURAL at 15:05

## 2024-07-17 RX ADMIN — ROCURONIUM BROMIDE 30 MG: 10 INJECTION, SOLUTION INTRAVENOUS at 15:05

## 2024-07-17 RX ADMIN — FENTANYL CITRATE 25 MCG: 50 INJECTION INTRAMUSCULAR; INTRAVENOUS at 16:04

## 2024-07-17 RX ADMIN — ONDANSETRON 4 MG: 2 INJECTION INTRAMUSCULAR; INTRAVENOUS at 16:03

## 2024-07-17 RX ADMIN — GLYCOPYRROLATE 0.2 MG: 0.2 INJECTION, SOLUTION INTRAMUSCULAR; INTRAVENOUS at 15:05

## 2024-07-17 RX ADMIN — PROPOFOL 150 MG: 10 INJECTION, EMULSION INTRAVENOUS at 15:05

## 2024-07-17 RX ADMIN — ONDANSETRON 4 MG: 2 INJECTION INTRAMUSCULAR; INTRAVENOUS at 15:05

## 2024-07-17 RX ADMIN — FENTANYL CITRATE 50 MCG: 50 INJECTION, SOLUTION INTRAMUSCULAR; INTRAVENOUS at 15:05

## 2024-07-17 RX ADMIN — DIPHENHYDRAMINE HYDROCHLORIDE 12.5 MG: 50 INJECTION, SOLUTION INTRAMUSCULAR; INTRAVENOUS at 15:05

## 2024-07-17 RX ADMIN — DEXAMETHASONE SODIUM PHOSPHATE 10 MG: 4 INJECTION, SOLUTION INTRAMUSCULAR; INTRAVENOUS at 15:05

## 2024-07-17 RX ADMIN — MIDAZOLAM 2 MG: 1 INJECTION INTRAMUSCULAR; INTRAVENOUS at 14:57

## 2024-07-17 RX ADMIN — SODIUM CHLORIDE: 9 INJECTION, SOLUTION INTRAVENOUS at 14:57

## 2024-07-17 RX ADMIN — SUGAMMADEX 500 MG: 100 INJECTION, SOLUTION INTRAVENOUS at 15:25

## 2024-07-17 RX ADMIN — FENTANYL CITRATE 50 MCG: 50 INJECTION, SOLUTION INTRAMUSCULAR; INTRAVENOUS at 15:40

## 2024-07-17 ASSESSMENT — PAIN DESCRIPTION - DESCRIPTORS
DESCRIPTORS: SORE
DESCRIPTORS: SHARP;SORE;TENDER
DESCRIPTORS: SHARP;SORE;TENDER
DESCRIPTORS: SHARP;SORE
DESCRIPTORS: SORE;TENDER

## 2024-07-17 ASSESSMENT — PAIN - FUNCTIONAL ASSESSMENT
PAIN_FUNCTIONAL_ASSESSMENT: 0-10

## 2024-07-17 ASSESSMENT — PAIN DESCRIPTION - LOCATION
LOCATION: THROAT
LOCATION: THROAT

## 2024-07-17 ASSESSMENT — PAIN SCALES - GENERAL
PAINLEVEL_OUTOF10: 7
PAINLEVEL_OUTOF10: 0

## 2024-07-17 ASSESSMENT — LIFESTYLE VARIABLES: SMOKING_STATUS: 1

## 2024-07-17 NOTE — PROGRESS NOTES
Patient asked about taking Roxicodone, she denies complications with taking it. She was given one and it has brought her pain from a 10 down to 7. Her thermal closure looks intact and free of complications outside of her discomfort. She is tolerating drinks well at this time.     Patients phone and vape returned to her person upon waking and family is aware. Patient discharged home with family via WC, prescriptions filled at our pharmacy and sent with patient, they verbalize understanding of discharge instructions and have no further questions at this time.     Electronically signed by Eneida Talamantes RN on 7/17/2024 at 5:14 PM

## 2024-07-17 NOTE — DISCHARGE INSTRUCTIONS
1. Follow up with Dr Rubio in 14 days  [phone]    2. A prescription for pain medication has been provided.    3. DIET: Avoid potato chips, peanuts, popcorn, and citrus juice.      DAY OF OPERATION: Small quantities of water frequently repeated.      Also sherbet in small quantity frequently repeated, cracked ice and popsicles.     GENERALLY THE SOONER THE PATIENT RETURNS TO A NORMAL DIET,THE BETTER AND QUICKER IS THE HEALING.    SECOND DAY: Milk, strained cereal, jello, pudding, beef or chicken broth, nabila, pop, and popsicles.    THIRD & FOURTH DAYS: Soft foods may be added gradually-mashed potatoes, soft cereals, soft boiled eggs, milk toast, etc.    4. Gargles should not be attempted unless recommended. Objectionable odors from the mouth are to be expected for several days. Coughing and hawking or clearing the throat are to be avoided as much as possible since bleeding may be started. Pain in the ears is common and usually comes from the throat. It is worse at night and before meals and in the morning. Frequent chewing of bubble gum or regular gum will help ease the pain.    5. If the patient starts bleeding from the tonsil, rinse the mouth with cold clear water.  If the bleeding stops, then be watch for further bleeding.  If bleeding continues, then go to the ER.  ER will contact ENT if surgical intervention is needed    6. A rise of 1/2 to 1 degree in the child's temperature post-operatively is usually expected in those who do not take adequate amount of liquids, and is caused by mild dehydration rather than infection.     7. NO STRENUOUS ACTIVITY FOR 2 WEEKS AFTER SURGERY.    8.No travel from the area for 2 weeks after surgery.

## 2024-07-17 NOTE — H&P
Clarice Rehman was seen and re-examined preoperatively today, July 17, 2024.  There was no substantial change in her physical and medical status. All Meds and Family/Social/Previous history was reviewed and there were no significant changes. Patient is fit for the proposed surgical procedure.  All questions were appropriately addressed and had no further questions regarding the risks, benefits, and alternatives of the procedure.  Clarice Rehman and family wished to proceed.    Kiko Hercules DO  Resident Physician  [facility]  Otolaryngology Residency  7/17/2024  2:37 PM

## 2024-07-17 NOTE — OP NOTE
7/17/2024  Clarice Rehman  [id number]      Pre-operative Diagnosis: recurrent tonsillitis    Post-operative Diagnosis: same    Procedure: Tonsillectomy     Anesthesia: General endotracheal anesthesia    Surgeons/Assistants: Joanne    Estimated Blood Loss: less than 50     Complications: None    Specimens: was Obtained: bilateral tonsils    Indication: Pt presented with a history of recurrent tonsillitis for the last 5 years.      Procedure: Pt was consented preoperatively.  Taken back into the OR and identified appropriately.  Placed in a supine position and given to anesthesia for general induction.  Once properly intubated, pt was turned to a 90 deg angle from anesthesia.  Pt was prepped and draped in a sterile fashion.  A [place] mouth gag was placed into the pt's oral cavity to give exposure to the tonsils.  The instrument was suspended from the [place] stand.  Starting with the Right, the tonsil was grasped with a curved cole forceps and retracted medially to expose the capsule.  The Coblator instrument, with a setting of 7 ablate and 3 coag, was used to dissect the tonsil from the capsule and tonsil bed.  Bleeding was controlled with the coag setting of the coblator.  Minimal bleeding was seen.  Once the tonsil was removed, it was given off the table for permanent pathology.  Attention was then turned to the Left tonsil, the tonsil was grasped with a curved cole forceps and retracted medially to expose the capsule.  The Coblator instrument, with a setting of 7 ablate and 3 coag, was used to dissect the tonsil from the capsule and tonsil bed.  Bleeding was controlled with the coag setting of the coblator.  Minimal bleeding was seen.  Once the tonsil was removed, it was given off the table for permanent pathology.  The coblator was then turned to a setting of 1 ablate and 5 coag.  The tonsillar beds were then treated until hemostasis was achieved.  Pt's stomach was suctioned out with a noy Pack  bleeding seen.  Pt was turned back to anesthesia for awakening.  Pt tolerated procedure well.

## 2024-07-17 NOTE — ANESTHESIA PRE PROCEDURE
Department of Anesthesiology  Preprocedure Note       Name:  Clarice Rehman   Age:  39 y.o.  :  1985                                          MRN:  [id number]         Date:  2024      Surgeon: Surgeon(s):  Graeme Rubio DO    Procedure: Procedure(s):  TONSILLECTOMY    Medications prior to admission:   Prior to Admission medications    Medication Sig Start Date End Date Taking? Authorizing Provider   PREMARIN 0.625 MG/GM CREA vaginal cream Place 0.5 g vaginally Twice a Week 24   Elvis Sheppard MD   clotrimazole (LOTRIMIN) 1 % vaginal cream Place vaginally 2 times daily for 7 days. 24   Pinky Carroll APRN - CNP   carisoprodol (SOMA) 350 MG tablet Take 1 tablet by mouth daily for 90 days. Max Daily Amount: 350 mg 24  Funmilayo Crespo APRN - NP   terbinafine (LAMISIL) 250 MG tablet Take 1 tablet by mouth daily  Patient not taking: Reported on 2024   Gera Waller DO   vitamin D (CHOLECALCIFEROL) 25 MCG (1000 UT) TABS tablet Take 1 tablet by mouth daily 3/13/24   Funmilayo Crespo APRN - NP   omeprazole (PRILOSEC) 20 MG delayed release capsule Take 1 capsule by mouth every morning (before breakfast) 3/12/24   Funmilayo Crespo APRN - NP   levothyroxine (SYNTHROID) 25 MCG tablet Take 1 tablet by mouth Daily 3/12/24 9/8/24  Funmilayo Crespo APRN - NP   pregabalin (LYRICA) 200 MG capsule TAKE 1 CAPSULE BY MOUTH TWICE DAILY 22   Olya Owens MD   clonazePAM (KLONOPIN) 0.5 MG tablet 3 times daily as needed. 22   Olya Owens MD   levETIRAcetam (KEPPRA) 500 MG tablet 1.5 tablets Takes 750mg in morning and 1000mg at night 21   Olya Owens MD       Current medications:    Current Facility-Administered Medications   Medication Dose Route Frequency Provider Last Rate Last Admin    lactated ringers IV soln infusion   IntraVENous Continuous Alston, Ikko J, DO        sodium chloride flush 0.9 % injection 5-40 mL  5-40 mL

## 2024-07-17 NOTE — ANESTHESIA POSTPROCEDURE EVALUATION
Department of Anesthesiology  Postprocedure Note    Patient: Clarice Rehman  MRN: [id number]  YOB: 1985  Date of evaluation: 7/17/2024    Procedure Summary       Date: 07/17/24 Room / Location: [address]    Anesthesia Start: 1457 Anesthesia Stop:     Procedure: TONSILLECTOMY (Throat) Diagnosis:       Chronic tonsillitis      (Chronic tonsillitis [J35.01])    Surgeons: Graeme Rubio DO Responsible Provider: Skye Cohen DO    Anesthesia Type: general ASA Status: 3            Anesthesia Type: No value filed.    Rocco Phase I:      Rocco Phase II:      Anesthesia Post Evaluation    Patient location during evaluation: PACU  Patient participation: complete - patient participated  Level of consciousness: sleepy but conscious  Pain score: 0  Airway patency: patent  Nausea & Vomiting: no vomiting and no nausea  Cardiovascular status: hemodynamically stable  Respiratory status: room air  Hydration status: stable  Pain management: adequate        No notable events documented.

## 2024-07-24 ENCOUNTER — TELEPHONE (OUTPATIENT)
Dept: PRIMARY CARE CLINIC | Age: 39
End: 2024-07-24

## 2024-07-24 NOTE — TELEPHONE ENCOUNTER
----- Message from TORI Joshi NP sent at 7/24/2024  3:50 PM EDT -----  Mammo is negative.  Repeat in 1 year.  ----- Message -----  From: Antoinette Cabral LPN  Sent: 7/24/2024   3:44 PM EDT  To: TORI Joshi NP

## 2024-07-29 LAB — SURGICAL PATHOLOGY REPORT: NORMAL

## 2024-07-31 ENCOUNTER — OFFICE VISIT (OUTPATIENT)
Dept: ENT CLINIC | Age: 39
End: 2024-07-31

## 2024-07-31 VITALS
BODY MASS INDEX: 26.29 KG/M2 | HEIGHT: 64 IN | OXYGEN SATURATION: 98 % | HEART RATE: 98 BPM | WEIGHT: 154 LBS | DIASTOLIC BLOOD PRESSURE: 73 MMHG | SYSTOLIC BLOOD PRESSURE: 117 MMHG

## 2024-07-31 DIAGNOSIS — Z90.89 S/P TONSILLECTOMY: Primary | ICD-10-CM

## 2024-07-31 PROCEDURE — 99024 POSTOP FOLLOW-UP VISIT: CPT | Performed by: OTOLARYNGOLOGY

## 2024-07-31 NOTE — PROGRESS NOTES
Subjective:      Patient ID:   Clarice Rehman is a 39 y.o.female.    HPI Comments: Pt returns for recheck after T&A.  Pt had problems with dehydration and pain but is now improved.      Review of Systems   HENT: Positive for sore throat, trouble swallowing and voice change.    All other systems reviewed and are negative.        Objective:   Physical Exam   Constitutional: She appears well-developed and well-nourished.   HENT:   Head: Normocephalic and atraumatic.   Right Ear: Tympanic membrane, external ear, pinna and canal normal.   Left Ear: Tympanic membrane, external ear, pinna and canal normal.   Nose: Nose normal.   Mouth/Throat: Mucous membranes are moist. Dentition is normal. Oropharynx is clear.       Tonsillar fossa healing well with minimal eschar bilaterally   Eyes: Conjunctivae are normal. Pupils are equal, round, and reactive to light.   Neck: Normal range of motion. Neck supple.   Cardiovascular: Regular rhythm, S1 normal and S2 normal.    Pulmonary/Chest: Effort normal and breath sounds normal.   Abdominal: Full and soft. Bowel sounds are normal.   Musculoskeletal: Normal range of motion.   Neurological: She is alert.   Skin: Skin is warm and moist.       Assessment:       Diagnosis Orders   1. S/P tonsillectomy                   Plan:      Pt may return to normal activities.    Follow up prn        Clarice Rehman  1985    I have discussed the case, including pertinent history and exam findings with the resident. I have seen and examined the patient and the key elements of the encounter have been performed by me. I agree with the assessment, plan and orders as documented by the  resident              Remainder of medical problems as per  resident note.    Patient seen and examined. Agree with above exam, assessment and plan.      Electronically signed by Graeme Rubio DO on 8/21/24 at 11:49 AM EDT

## 2024-08-22 ENCOUNTER — OFFICE VISIT (OUTPATIENT)
Dept: FAMILY MEDICINE CLINIC | Age: 39
End: 2024-08-22
Payer: OTHER GOVERNMENT

## 2024-08-22 VITALS
TEMPERATURE: 97.8 F | SYSTOLIC BLOOD PRESSURE: 104 MMHG | WEIGHT: 154 LBS | RESPIRATION RATE: 18 BRPM | OXYGEN SATURATION: 96 % | HEIGHT: 64 IN | DIASTOLIC BLOOD PRESSURE: 50 MMHG | BODY MASS INDEX: 26.29 KG/M2 | HEART RATE: 92 BPM

## 2024-08-22 DIAGNOSIS — N39.0 URINARY TRACT INFECTION WITHOUT HEMATURIA, SITE UNSPECIFIED: Primary | ICD-10-CM

## 2024-08-22 DIAGNOSIS — R39.9 UTI SYMPTOMS: ICD-10-CM

## 2024-08-22 LAB
BILIRUBIN, POC: NORMAL
BLOOD URINE, POC: NORMAL
CLARITY, POC: NORMAL
COLOR, POC: YELLOW
GLUCOSE URINE, POC: NORMAL
KETONES, POC: NORMAL
LEUKOCYTE EST, POC: NORMAL
NITRITE, POC: NORMAL
PH, POC: 6.5
PROTEIN, POC: NORMAL
SPECIFIC GRAVITY, POC: >=1.03
UROBILINOGEN, POC: 0.2

## 2024-08-22 PROCEDURE — 81002 URINALYSIS NONAUTO W/O SCOPE: CPT | Performed by: NURSE PRACTITIONER

## 2024-08-22 PROCEDURE — 99213 OFFICE O/P EST LOW 20 MIN: CPT | Performed by: NURSE PRACTITIONER

## 2024-08-22 RX ORDER — SULFAMETHOXAZOLE AND TRIMETHOPRIM 800; 160 MG/1; MG/1
1 TABLET ORAL 2 TIMES DAILY
Qty: 6 TABLET | Refills: 0 | Status: SHIPPED
Start: 2024-08-22 | End: 2024-08-24 | Stop reason: SDUPTHER

## 2024-08-22 NOTE — PROGRESS NOTES
mouth 2 times daily for 3 days    UTI symptoms  -     POCT Urinalysis no Micro  -     Culture, Urine; Future        - Disposition: Home    - Educational material printed for patient's review and were included in patient instructions. After Visit Summary was given to patient at the end of visit.    - Patient is advised to rest and increase oral fluids. Discussed other symptomatic treatments with the patient today. The patient is to follow-up with PCP in the next 2-3 days for reevaluation. Red flag symptoms were also discussed with the patient today. If symptoms worsen the patient is to go directly to the emergency department for reevaluation and treatment. Pt verbalizes understanding and is in agreement with plan of care. All questions answered.    SIGNATURE: TORI Herrera-CNP    *NOTE: This report was transcribed using voice recognition software. Every effort was made to ensure accuracy; however, inadvertent computerized transcription errors may be present.

## 2024-08-23 ENCOUNTER — TELEPHONE (OUTPATIENT)
Dept: PRIMARY CARE CLINIC | Age: 39
End: 2024-08-23

## 2024-08-23 NOTE — TELEPHONE ENCOUNTER
Patient states that she took one of her antibiotics yesterday and now she can't find them anywhere. She wants to know if you can send in a new prescription.

## 2024-08-24 DIAGNOSIS — N39.0 URINARY TRACT INFECTION WITHOUT HEMATURIA, SITE UNSPECIFIED: ICD-10-CM

## 2024-08-24 RX ORDER — SULFAMETHOXAZOLE AND TRIMETHOPRIM 800; 160 MG/1; MG/1
1 TABLET ORAL 2 TIMES DAILY
Qty: 6 TABLET | Refills: 0 | Status: SHIPPED | OUTPATIENT
Start: 2024-08-24 | End: 2024-08-27

## 2024-08-25 LAB
CULTURE: ABNORMAL
LEVETIRACETAM SERPL-MCNC: 18 UG/ML
SPECIMEN DESCRIPTION: ABNORMAL

## 2024-09-10 ENCOUNTER — OFFICE VISIT (OUTPATIENT)
Dept: PRIMARY CARE CLINIC | Age: 39
End: 2024-09-10

## 2024-09-10 VITALS
HEART RATE: 76 BPM | OXYGEN SATURATION: 98 % | WEIGHT: 154 LBS | TEMPERATURE: 98.8 F | SYSTOLIC BLOOD PRESSURE: 122 MMHG | DIASTOLIC BLOOD PRESSURE: 70 MMHG | HEIGHT: 64 IN | BODY MASS INDEX: 26.29 KG/M2

## 2024-09-10 DIAGNOSIS — R56.9 SEIZURES (HCC): ICD-10-CM

## 2024-09-10 DIAGNOSIS — G62.9 NEUROPATHY: ICD-10-CM

## 2024-09-10 DIAGNOSIS — F41.9 ANXIETY: Primary | ICD-10-CM

## 2024-09-10 DIAGNOSIS — E03.9 HYPOTHYROIDISM, UNSPECIFIED TYPE: ICD-10-CM

## 2024-09-10 DIAGNOSIS — M79.7 FIBROMYALGIA: ICD-10-CM

## 2024-09-10 DIAGNOSIS — G89.29 CHRONIC BILATERAL LOW BACK PAIN WITHOUT SCIATICA: ICD-10-CM

## 2024-09-10 DIAGNOSIS — G47.00 INSOMNIA, UNSPECIFIED TYPE: ICD-10-CM

## 2024-09-10 DIAGNOSIS — F31.9 BIPOLAR 1 DISORDER (HCC): ICD-10-CM

## 2024-09-10 DIAGNOSIS — M54.50 CHRONIC BILATERAL LOW BACK PAIN WITHOUT SCIATICA: ICD-10-CM

## 2024-09-10 DIAGNOSIS — K21.9 GASTROESOPHAGEAL REFLUX DISEASE WITHOUT ESOPHAGITIS: ICD-10-CM

## 2024-09-10 RX ORDER — MELOXICAM 15 MG/1
15 TABLET ORAL DAILY PRN
Qty: 90 TABLET | Refills: 1 | Status: SHIPPED | OUTPATIENT
Start: 2024-09-10

## 2024-09-10 RX ORDER — CARISOPRODOL 350 MG/1
350 TABLET ORAL DAILY
Qty: 30 TABLET | Refills: 2 | Status: SHIPPED | OUTPATIENT
Start: 2024-09-10 | End: 2024-12-09

## 2024-09-10 RX ORDER — LEVOTHYROXINE SODIUM 25 UG/1
25 TABLET ORAL DAILY
Qty: 90 TABLET | Refills: 1 | Status: SHIPPED | OUTPATIENT
Start: 2024-09-10 | End: 2025-03-09

## 2024-09-10 RX ORDER — CARISOPRODOL 350 MG/1
350 TABLET ORAL DAILY
Qty: 30 TABLET | Refills: 2 | Status: CANCELLED | OUTPATIENT
Start: 2024-09-10 | End: 2024-12-09

## 2024-09-11 PROBLEM — J35.01 CHRONIC TONSILLITIS: Status: RESOLVED | Noted: 2024-06-17 | Resolved: 2024-09-11

## 2024-09-11 PROBLEM — N76.6 ULCERATION OF VULVA: Status: RESOLVED | Noted: 2024-06-14 | Resolved: 2024-09-11

## 2024-09-11 PROBLEM — J02.0 RECURRENT STREPTOCOCCAL PHARYNGITIS: Status: RESOLVED | Noted: 2024-06-14 | Resolved: 2024-09-11

## 2024-09-11 ASSESSMENT — ENCOUNTER SYMPTOMS
APNEA: 0
CHEST TIGHTNESS: 0
ABDOMINAL PAIN: 0
BACK PAIN: 0
SHORTNESS OF BREATH: 0
NAUSEA: 0
COUGH: 0
FACIAL SWELLING: 0
VOMITING: 0
EYES NEGATIVE: 1
CONSTIPATION: 0
RHINORRHEA: 0
COLOR CHANGE: 0
VOICE CHANGE: 0
WHEEZING: 0
ABDOMINAL DISTENTION: 0
DIARRHEA: 0

## 2024-10-18 ENCOUNTER — OFFICE VISIT (OUTPATIENT)
Dept: FAMILY MEDICINE CLINIC | Age: 39
End: 2024-10-18
Payer: OTHER GOVERNMENT

## 2024-10-18 VITALS
OXYGEN SATURATION: 98 % | RESPIRATION RATE: 18 BRPM | BODY MASS INDEX: 26.29 KG/M2 | SYSTOLIC BLOOD PRESSURE: 122 MMHG | DIASTOLIC BLOOD PRESSURE: 70 MMHG | HEART RATE: 106 BPM | WEIGHT: 154 LBS | TEMPERATURE: 97.3 F | HEIGHT: 64 IN

## 2024-10-18 DIAGNOSIS — N39.0 URINARY TRACT INFECTION WITHOUT HEMATURIA, SITE UNSPECIFIED: Primary | ICD-10-CM

## 2024-10-18 DIAGNOSIS — J02.9 SORE THROAT: ICD-10-CM

## 2024-10-18 DIAGNOSIS — R39.9 UTI SYMPTOMS: ICD-10-CM

## 2024-10-18 DIAGNOSIS — R05.1 ACUTE COUGH: ICD-10-CM

## 2024-10-18 LAB
BILIRUBIN, POC: NORMAL
BLOOD URINE, POC: NORMAL
CLARITY, POC: NORMAL
COLOR, POC: YELLOW
CONTROL: NORMAL
GLUCOSE URINE, POC: NORMAL MG/DL
KETONES, POC: NORMAL MG/DL
LEUKOCYTE EST, POC: NORMAL
NITRITE, POC: NORMAL
PH, POC: 5.5
PREGNANCY TEST URINE, POC: NORMAL
PROTEIN, POC: NORMAL MG/DL
S PYO AG THROAT QL: NORMAL
SPECIFIC GRAVITY, POC: >=1.03
UROBILINOGEN, POC: NORMAL MG/DL

## 2024-10-18 PROCEDURE — 99214 OFFICE O/P EST MOD 30 MIN: CPT | Performed by: NURSE PRACTITIONER

## 2024-10-18 PROCEDURE — 81002 URINALYSIS NONAUTO W/O SCOPE: CPT | Performed by: NURSE PRACTITIONER

## 2024-10-18 PROCEDURE — 81025 URINE PREGNANCY TEST: CPT | Performed by: NURSE PRACTITIONER

## 2024-10-18 PROCEDURE — 87880 STREP A ASSAY W/OPTIC: CPT | Performed by: NURSE PRACTITIONER

## 2024-10-18 RX ORDER — CEFDINIR 300 MG/1
300 CAPSULE ORAL 2 TIMES DAILY
Qty: 14 CAPSULE | Refills: 0 | Status: SHIPPED | OUTPATIENT
Start: 2024-10-18 | End: 2024-10-25

## 2024-10-18 RX ORDER — BROMPHENIRAMINE MALEATE, PSEUDOEPHEDRINE HYDROCHLORIDE, AND DEXTROMETHORPHAN HYDROBROMIDE 2; 30; 10 MG/5ML; MG/5ML; MG/5ML
SYRUP ORAL
Qty: 120 ML | Refills: 0 | Status: SHIPPED | OUTPATIENT
Start: 2024-10-18

## 2024-10-18 NOTE — PROGRESS NOTES
Topics    Alcohol use: Yes     Comment: occasionally    Drug use: Never       Physical Exam:     Vitals:    10/18/24 0819   BP: 122/70   Pulse: (!) 106   Resp: 18   Temp: 97.3 °F (36.3 °C)   TempSrc: Temporal   SpO2: 98%   Weight: 69.9 kg (154 lb)   Height: 1.626 m (5' 4\")       Physical Exam (PE)   Constitutional: Alert, development consistent with age.  HENT:      Head: Normocephalic.      Right Ear: External ear normal. TM intact with positive light reflex     Left Ear: External ear normal. TM intact with positive light reflex     Nose: Rhinitis.      Mouth/Throat:     Mouth: Mucous membranes are moist.      Pharynx: Oropharynx is clear.  Eyes: Pupils: Pupils are equal, round, and reactive to light.   Neck: Normal ROM. Supple.  Cardiovascular: Heart RRR without pathologic murmurs or gallops.   Pulmonary: Respiratory effort normal.  Normal breath sounds.  Abdomen: Soft, nondistended, with mild suprapubic tenderness. No rebound, rigidity, or guarding. BS+ X4. No organomegaly.     Back: Negative CVA tenderness.  Skin:  Normal turgor.  Warm, dry, without visible rash, unless noted elsewhere.  Musculoskeletal: General: Normal strength / ROM.  Neurological:  Alert and oriented.  Motor functions intact. Responds to verbal commands.   Psychiatric: Mood and Affect: Mood normal. Behavior: Behavior normal    Testing:   (All laboratory and radiology results have been personally reviewed by myself)  Labs:  Results for orders placed or performed in visit on 10/18/24   POCT Urinalysis no Micro   Result Value Ref Range    Color, UA yellow     Clarity, UA cloudy     Glucose, UA POC neg mg/dL    Bilirubin, UA neg     Ketones, UA neg mg/dL    Spec Grav, UA >=1.030     Blood, UA POC neg     pH, UA 5.5     Protein, UA POC trace mg/dL    Urobilinogen, UA 0.2 eu/dl mg/dL    Leukocytes, UA trace     Nitrite, UA neg    POCT urine pregnancy   Result Value Ref Range    Preg Test, Ur neg Negative    Control     POCT rapid strep A   Result

## 2024-10-20 LAB
CULTURE: ABNORMAL
SPECIMEN DESCRIPTION: ABNORMAL

## 2024-10-21 LAB
CULTURE: ABNORMAL
SPECIMEN DESCRIPTION: ABNORMAL

## 2024-12-11 ENCOUNTER — OFFICE VISIT (OUTPATIENT)
Dept: FAMILY MEDICINE CLINIC | Age: 39
End: 2024-12-11

## 2024-12-11 VITALS
WEIGHT: 165 LBS | SYSTOLIC BLOOD PRESSURE: 112 MMHG | HEART RATE: 90 BPM | DIASTOLIC BLOOD PRESSURE: 80 MMHG | HEIGHT: 64 IN | OXYGEN SATURATION: 98 % | RESPIRATION RATE: 18 BRPM | TEMPERATURE: 98.2 F | BODY MASS INDEX: 28.17 KG/M2

## 2024-12-11 DIAGNOSIS — R39.9 UTI SYMPTOMS: ICD-10-CM

## 2024-12-11 DIAGNOSIS — R30.0 DYSURIA: Primary | ICD-10-CM

## 2024-12-11 LAB
BILIRUBIN, POC: ABNORMAL
BLOOD URINE, POC: ABNORMAL
CLARITY, POC: ABNORMAL
COLOR, POC: ABNORMAL
CONTROL: NORMAL
GLUCOSE URINE, POC: ABNORMAL MG/DL
KETONES, POC: ABNORMAL MG/DL
LEUKOCYTE EST, POC: ABNORMAL
NITRITE, POC: ABNORMAL
PH, POC: 6.5
PREGNANCY TEST URINE, POC: NORMAL
PROTEIN, POC: ABNORMAL MG/DL
SPECIFIC GRAVITY, POC: 1.02
UROBILINOGEN, POC: 1 MG/DL

## 2024-12-11 RX ORDER — PHENAZOPYRIDINE HYDROCHLORIDE 200 MG/1
200 TABLET, FILM COATED ORAL 3 TIMES DAILY PRN
Qty: 6 TABLET | Refills: 0 | Status: SHIPPED | OUTPATIENT
Start: 2024-12-11 | End: 2024-12-13

## 2024-12-11 RX ORDER — NITROFURANTOIN 25; 75 MG/1; MG/1
100 CAPSULE ORAL 2 TIMES DAILY
Qty: 10 CAPSULE | Refills: 0 | Status: SHIPPED | OUTPATIENT
Start: 2024-12-11 | End: 2024-12-16

## 2024-12-11 ASSESSMENT — ENCOUNTER SYMPTOMS
NAUSEA: 0
SORE THROAT: 0
BACK PAIN: 0
COUGH: 0
PHOTOPHOBIA: 0
ABDOMINAL PAIN: 0
VOMITING: 0
SHORTNESS OF BREATH: 0
DIARRHEA: 0

## 2024-12-11 NOTE — PROGRESS NOTES
24  Clarice Rehman : 1985 Sex: female  Age 39 y.o.      Subjective:  Chief Complaint   Patient presents with    Urinary Frequency     X about a week          39-year-old female presents to the walk-in clinic for evaluation of UTI symptoms.  Patient states for about 1 week she has been having urinary frequency, urgency and dysuria.  Patient states that she is very prone to UTIs.  At one point she was seeing urology and had a prescription for Keflex to use postcoital.  Patient denies fever, chills, nausea or vomiting.  She denies shortness of breath or chest pain.  No abdominal pain, back pain or flank pain.  Patient denies pregnancy as she has had a hysterectomy.  Patient states she did take 1 Pyridium last night.          Review of Systems   Constitutional:  Negative for chills and fever.   HENT:  Negative for congestion, ear pain and sore throat.    Eyes:  Negative for photophobia and visual disturbance.   Respiratory:  Negative for cough and shortness of breath.    Cardiovascular:  Negative for chest pain.   Gastrointestinal:  Negative for abdominal pain, diarrhea, nausea and vomiting.   Genitourinary:  Positive for dysuria, frequency and urgency. Negative for difficulty urinating.   Musculoskeletal:  Negative for back pain, neck pain and neck stiffness.   Skin:  Negative for rash.   Neurological:  Negative for dizziness, syncope, weakness, light-headedness and headaches.   Hematological:  Negative for adenopathy. Does not bruise/bleed easily.   Psychiatric/Behavioral:  Negative for agitation and confusion.    All other systems reviewed and are negative.        PMH:     Past Medical History:   Diagnosis Date    Epilepsy (HCC)     last seizure     Recurrent streptococcal pharyngitis 2024    Thyroid disease        Past Surgical History:   Procedure Laterality Date    BACK SURGERY      ENDOMETRIAL ABLATION  2023    FOOT SURGERY Left 2014    i and d with biopsy and removal of

## 2024-12-12 LAB
CULTURE: NORMAL
SPECIMEN DESCRIPTION: NORMAL

## 2025-01-26 DIAGNOSIS — G47.00 INSOMNIA, UNSPECIFIED TYPE: ICD-10-CM

## 2025-01-27 RX ORDER — CARISOPRODOL 350 MG/1
TABLET ORAL
COMMUNITY
Start: 2024-12-24 | End: 2025-01-27 | Stop reason: DRUGHIGH

## 2025-01-27 RX ORDER — CARISOPRODOL 350 MG/1
TABLET ORAL
Qty: 30 TABLET | Refills: 2 | Status: SHIPPED | OUTPATIENT
Start: 2025-01-27 | End: 2025-04-27

## 2025-01-27 NOTE — TELEPHONE ENCOUNTER
Name of Medication(s) Requested:  Requested Prescriptions     Pending Prescriptions Disp Refills    carisoprodol (SOMA) 350 MG tablet [Pharmacy Med Name: CARISOPRODOL 350MG TABLETS] 30 tablet      Sig: TAKE 1 TABLET BY MOUTH DAILY. MAX DAILY AMOUNT: 350 MG       Medication is on current medication list Yes    Dosage and directions were verified? Yes    Quantity verified: 30 day supply     Pharmacy Verified?  Yes    Last Appointment:  9/10/2024    Future appts:  Future Appointments   Date Time Provider Department Center   7/18/2025 10:00 AM Elvis Sheppard MD [facility]        (If no appt send self scheduling link. .REFILLAPPT)  Scheduling request sent?     [x] Yes  [] No    Does patient need updated?  [x] Yes  [] No

## (undated) DEVICE — SURGICAL PROCEDURE PACK EENT CUST

## (undated) DEVICE — EVAC 70 XTRA WAND: Brand: COBLATION

## (undated) DEVICE — SOLUTION IV 500ML 0.9% SOD CHL PH 5 INJ USP VIAFLX PLAS

## (undated) DEVICE — CATHETER ETER SUCT 14FR RED POLYPR STR OPN W VLV

## (undated) DEVICE — TOWEL,OR,DSP,ST,BLUE,STD,6/PK,12PK/CS: Brand: MEDLINE

## (undated) DEVICE — SYRINGE,EAR/ULCER, 2 OZ, STERILE: Brand: MEDLINE

## (undated) DEVICE — 4-PORT MANIFOLD: Brand: NEPTUNE 2

## (undated) DEVICE — MARKER,SKIN,WI/RULER AND LABELS: Brand: MEDLINE

## (undated) DEVICE — SPONGE,TONSIL,DBL STRNG,XRAY,MED,1",STRL: Brand: MEDLINE INDUSTRIES, INC.

## (undated) DEVICE — BASIC SINGLE BASIN 1-LF: Brand: MEDLINE INDUSTRIES, INC.

## (undated) DEVICE — LIGHT SOURCE WHT

## (undated) DEVICE — GOWN,SIRUS,FABRNF,L,20/CS: Brand: MEDLINE

## (undated) DEVICE — SOLUTION IV 1000ML 0.9% SOD CHL PH 5 INJ USP VIAFLX PLAS

## (undated) DEVICE — GLOVE ORANGE PI 8 1/2   MSG9085

## (undated) DEVICE — ELECTRODE PT RET AD L9FT HI MOIST COND ADH HYDRGEL CORDED

## (undated) DEVICE — KIT,ANTI FOG,W/SPONGE & FLUID,SOFT PACK: Brand: MEDLINE